# Patient Record
Sex: FEMALE | Race: WHITE | Employment: FULL TIME | URBAN - METROPOLITAN AREA
[De-identification: names, ages, dates, MRNs, and addresses within clinical notes are randomized per-mention and may not be internally consistent; named-entity substitution may affect disease eponyms.]

---

## 2019-12-11 LAB
CHLAMYDIA, EXTERNAL: NEGATIVE
HBSAG, EXTERNAL: NEGATIVE
HIV, EXTERNAL: NON REACTIVE
N. GONORRHEA, EXTERNAL: NEGATIVE
RUBELLA, EXTERNAL: NORMAL
T. PALLIDUM, EXTERNAL: NON REACTIVE
TYPE, ABO & RH, EXTERNAL: NORMAL

## 2020-05-11 LAB — ANTIBODY SCREEN, EXTERNAL: NEGATIVE

## 2020-07-08 ENCOUNTER — ANESTHESIA EVENT (OUTPATIENT)
Dept: LABOR AND DELIVERY | Age: 30
End: 2020-07-08
Payer: COMMERCIAL

## 2020-07-09 ENCOUNTER — HOSPITAL ENCOUNTER (OUTPATIENT)
Dept: PREADMISSION TESTING | Age: 30
Discharge: HOME | End: 2020-07-09
Payer: COMMERCIAL

## 2020-07-09 DIAGNOSIS — U07.1 COVID-19: ICD-10-CM

## 2020-07-09 PROCEDURE — 87635 SARS-COV-2 COVID-19 AMP PRB: CPT

## 2020-07-10 LAB — SARS-COV-2, COV2NT: NOT DETECTED

## 2020-07-13 ENCOUNTER — HOSPITAL ENCOUNTER (INPATIENT)
Age: 30
LOS: 3 days | Discharge: HOME | End: 2020-07-16
Attending: OBSTETRICS & GYNECOLOGY | Admitting: OBSTETRICS & GYNECOLOGY
Payer: COMMERCIAL

## 2020-07-13 ENCOUNTER — ANESTHESIA (OUTPATIENT)
Dept: LABOR AND DELIVERY | Age: 30
End: 2020-07-13
Payer: COMMERCIAL

## 2020-07-13 DIAGNOSIS — G89.18 POSTOPERATIVE PAIN: Primary | ICD-10-CM

## 2020-07-13 LAB
ALBUMIN SERPL-MCNC: 2.5 G/DL (ref 3.5–5)
ALBUMIN/GLOB SERPL: 0.7 {RATIO} (ref 1.1–2.2)
ALP SERPL-CCNC: 202 U/L (ref 45–117)
ALT SERPL-CCNC: 16 U/L (ref 12–78)
ANION GAP SERPL CALC-SCNC: 10 MMOL/L (ref 5–15)
AST SERPL-CCNC: 18 U/L (ref 15–37)
BILIRUB SERPL-MCNC: 0.3 MG/DL (ref 0.2–1)
BUN SERPL-MCNC: 7 MG/DL (ref 6–20)
BUN/CREAT SERPL: 11 (ref 12–20)
CALCIUM SERPL-MCNC: 8.6 MG/DL (ref 8.5–10.1)
CHLORIDE SERPL-SCNC: 111 MMOL/L (ref 97–108)
CO2 SERPL-SCNC: 20 MMOL/L (ref 21–32)
CREAT SERPL-MCNC: 0.61 MG/DL (ref 0.55–1.02)
CREAT UR-MCNC: 137 MG/DL
ERYTHROCYTE [DISTWIDTH] IN BLOOD BY AUTOMATED COUNT: 13.3 % (ref 11.5–14.5)
GLOBULIN SER CALC-MCNC: 3.4 G/DL (ref 2–4)
GLUCOSE SERPL-MCNC: 75 MG/DL (ref 65–100)
GRBS, EXTERNAL: POSITIVE
HCT VFR BLD AUTO: 33.6 % (ref 35–47)
HGB BLD-MCNC: 10.8 G/DL (ref 11.5–16)
LDH SERPL L TO P-CCNC: 205 U/L (ref 81–246)
MCH RBC QN AUTO: 28.7 PG (ref 26–34)
MCHC RBC AUTO-ENTMCNC: 32.1 G/DL (ref 30–36.5)
MCV RBC AUTO: 89.4 FL (ref 80–99)
NRBC # BLD: 0 K/UL (ref 0–0.01)
NRBC BLD-RTO: 0 PER 100 WBC
PLATELET # BLD AUTO: 212 K/UL (ref 150–400)
PMV BLD AUTO: 10.8 FL (ref 8.9–12.9)
POTASSIUM SERPL-SCNC: 4 MMOL/L (ref 3.5–5.1)
PROT SERPL-MCNC: 5.9 G/DL (ref 6.4–8.2)
PROT UR-MCNC: 41 MG/DL (ref 0–11.9)
PROT/CREAT UR-RTO: 0.3
RBC # BLD AUTO: 3.76 M/UL (ref 3.8–5.2)
SODIUM SERPL-SCNC: 141 MMOL/L (ref 136–145)
WBC # BLD AUTO: 9.5 K/UL (ref 3.6–11)

## 2020-07-13 PROCEDURE — 76060000078 HC EPIDURAL ANESTHESIA: Performed by: OBSTETRICS & GYNECOLOGY

## 2020-07-13 PROCEDURE — 36415 COLL VENOUS BLD VENIPUNCTURE: CPT

## 2020-07-13 PROCEDURE — 77030031139 HC SUT VCRL2 J&J -A

## 2020-07-13 PROCEDURE — 84156 ASSAY OF PROTEIN URINE: CPT

## 2020-07-13 PROCEDURE — 74011000250 HC RX REV CODE- 250: Performed by: ANESTHESIOLOGY

## 2020-07-13 PROCEDURE — 74011250636 HC RX REV CODE- 250/636: Performed by: OBSTETRICS & GYNECOLOGY

## 2020-07-13 PROCEDURE — 76010000397 HC C SECN MULTIPL FIRST 1 HR: Performed by: OBSTETRICS & GYNECOLOGY

## 2020-07-13 PROCEDURE — 77030002974 HC SUT PLN J&J -A

## 2020-07-13 PROCEDURE — 77030040830 HC CATH URETH FOL MDII -A

## 2020-07-13 PROCEDURE — 74011250636 HC RX REV CODE- 250/636: Performed by: NURSE ANESTHETIST, CERTIFIED REGISTERED

## 2020-07-13 PROCEDURE — 74011250636 HC RX REV CODE- 250/636

## 2020-07-13 PROCEDURE — 75410000003 HC RECOV DEL/VAG/CSECN EA 0.5 HR: Performed by: OBSTETRICS & GYNECOLOGY

## 2020-07-13 PROCEDURE — 86921 COMPATIBILITY TEST INCUBATE: CPT

## 2020-07-13 PROCEDURE — 80053 COMPREHEN METABOLIC PANEL: CPT

## 2020-07-13 PROCEDURE — 74011250636 HC RX REV CODE- 250/636: Performed by: ANESTHESIOLOGY

## 2020-07-13 PROCEDURE — 86900 BLOOD TYPING SEROLOGIC ABO: CPT

## 2020-07-13 PROCEDURE — 86920 COMPATIBILITY TEST SPIN: CPT

## 2020-07-13 PROCEDURE — 77030040361 HC SLV COMPR DVT MDII -B

## 2020-07-13 PROCEDURE — 77030018836 HC SOL IRR NACL ICUM -A

## 2020-07-13 PROCEDURE — 77030011220 HC DEV ELECSURG COVD -B

## 2020-07-13 PROCEDURE — 74011000250 HC RX REV CODE- 250: Performed by: OBSTETRICS & GYNECOLOGY

## 2020-07-13 PROCEDURE — 77030040012

## 2020-07-13 PROCEDURE — 83615 LACTATE (LD) (LDH) ENZYME: CPT

## 2020-07-13 PROCEDURE — 86870 RBC ANTIBODY IDENTIFICATION: CPT

## 2020-07-13 PROCEDURE — 86922 COMPATIBILITY TEST ANTIGLOB: CPT

## 2020-07-13 PROCEDURE — 77030007866 HC KT SPN ANES BBMI -B: Performed by: NURSE ANESTHETIST, CERTIFIED REGISTERED

## 2020-07-13 PROCEDURE — 77030002933 HC SUT MCRYL J&J -A

## 2020-07-13 PROCEDURE — 77030018846 HC SOL IRR STRL H20 ICUM -A

## 2020-07-13 PROCEDURE — 65410000002 HC RM PRIVATE OB

## 2020-07-13 PROCEDURE — 77030041076 HC DRSG AG OPTICELL MDII -A

## 2020-07-13 PROCEDURE — 76010000398 HC C SECN MULTIPL EA ADDL 0.5 HR: Performed by: OBSTETRICS & GYNECOLOGY

## 2020-07-13 PROCEDURE — 85027 COMPLETE CBC AUTOMATED: CPT

## 2020-07-13 RX ORDER — MORPHINE SULFATE 10 MG/ML
6 INJECTION, SOLUTION INTRAMUSCULAR; INTRAVENOUS
Status: ACTIVE | OUTPATIENT
Start: 2020-07-13 | End: 2020-07-14

## 2020-07-13 RX ORDER — AMMONIA 15 % (W/V)
1 AMPUL (EA) INHALATION AS NEEDED
Status: DISCONTINUED | OUTPATIENT
Start: 2020-07-13 | End: 2020-07-16 | Stop reason: HOSPADM

## 2020-07-13 RX ORDER — OXYCODONE AND ACETAMINOPHEN 5; 325 MG/1; MG/1
1 TABLET ORAL
Status: DISCONTINUED | OUTPATIENT
Start: 2020-07-13 | End: 2020-07-16 | Stop reason: HOSPADM

## 2020-07-13 RX ORDER — ONDANSETRON 2 MG/ML
4 INJECTION INTRAMUSCULAR; INTRAVENOUS
Status: DISCONTINUED | OUTPATIENT
Start: 2020-07-13 | End: 2020-07-16 | Stop reason: HOSPADM

## 2020-07-13 RX ORDER — OXYTOCIN/RINGER'S LACTATE 20/1000 ML
999 PLASTIC BAG, INJECTION (ML) INTRAVENOUS AS NEEDED
Status: DISCONTINUED | OUTPATIENT
Start: 2020-07-13 | End: 2020-07-16 | Stop reason: HOSPADM

## 2020-07-13 RX ORDER — SODIUM CHLORIDE 0.9 % (FLUSH) 0.9 %
5-40 SYRINGE (ML) INJECTION AS NEEDED
Status: DISCONTINUED | OUTPATIENT
Start: 2020-07-13 | End: 2020-07-16 | Stop reason: HOSPADM

## 2020-07-13 RX ORDER — SODIUM CHLORIDE 0.9 % (FLUSH) 0.9 %
5-40 SYRINGE (ML) INJECTION EVERY 8 HOURS
Status: DISCONTINUED | OUTPATIENT
Start: 2020-07-13 | End: 2020-07-16 | Stop reason: HOSPADM

## 2020-07-13 RX ORDER — SIMETHICONE 80 MG
80 TABLET,CHEWABLE ORAL
Status: DISCONTINUED | OUTPATIENT
Start: 2020-07-13 | End: 2020-07-16 | Stop reason: HOSPADM

## 2020-07-13 RX ORDER — FOLIC ACID 1 MG/1
TABLET ORAL DAILY
COMMUNITY
End: 2020-07-16

## 2020-07-13 RX ORDER — SODIUM CHLORIDE, SODIUM LACTATE, POTASSIUM CHLORIDE, CALCIUM CHLORIDE 600; 310; 30; 20 MG/100ML; MG/100ML; MG/100ML; MG/100ML
INJECTION, SOLUTION INTRAVENOUS
Status: DISCONTINUED | OUTPATIENT
Start: 2020-07-13 | End: 2020-07-13 | Stop reason: HOSPADM

## 2020-07-13 RX ORDER — MORPHINE SULFATE 10 MG/ML
10 INJECTION, SOLUTION INTRAMUSCULAR; INTRAVENOUS
Status: ACTIVE | OUTPATIENT
Start: 2020-07-13 | End: 2020-07-14

## 2020-07-13 RX ORDER — OXYCODONE AND ACETAMINOPHEN 5; 325 MG/1; MG/1
2 TABLET ORAL
Status: DISCONTINUED | OUTPATIENT
Start: 2020-07-13 | End: 2020-07-16 | Stop reason: HOSPADM

## 2020-07-13 RX ORDER — DOCUSATE SODIUM 100 MG/1
100 CAPSULE, LIQUID FILLED ORAL
Status: DISCONTINUED | OUTPATIENT
Start: 2020-07-13 | End: 2020-07-16 | Stop reason: HOSPADM

## 2020-07-13 RX ORDER — MORPHINE SULFATE 1 MG/ML
INJECTION, SOLUTION EPIDURAL; INTRATHECAL; INTRAVENOUS
Status: COMPLETED | OUTPATIENT
Start: 2020-07-13 | End: 2020-07-13

## 2020-07-13 RX ORDER — OXYTOCIN/RINGER'S LACTATE 20/1000 ML
125 PLASTIC BAG, INJECTION (ML) INTRAVENOUS AS NEEDED
Status: DISCONTINUED | OUTPATIENT
Start: 2020-07-13 | End: 2020-07-16 | Stop reason: HOSPADM

## 2020-07-13 RX ORDER — SODIUM CHLORIDE, SODIUM LACTATE, POTASSIUM CHLORIDE, CALCIUM CHLORIDE 600; 310; 30; 20 MG/100ML; MG/100ML; MG/100ML; MG/100ML
125 INJECTION, SOLUTION INTRAVENOUS CONTINUOUS
Status: DISCONTINUED | OUTPATIENT
Start: 2020-07-13 | End: 2020-07-16 | Stop reason: HOSPADM

## 2020-07-13 RX ORDER — ACETAMINOPHEN 325 MG/1
650 TABLET ORAL
Status: DISCONTINUED | OUTPATIENT
Start: 2020-07-13 | End: 2020-07-16 | Stop reason: HOSPADM

## 2020-07-13 RX ORDER — IBUPROFEN 400 MG/1
800 TABLET ORAL EVERY 8 HOURS
Status: DISCONTINUED | OUTPATIENT
Start: 2020-07-13 | End: 2020-07-16 | Stop reason: HOSPADM

## 2020-07-13 RX ORDER — ONDANSETRON 2 MG/ML
INJECTION INTRAMUSCULAR; INTRAVENOUS AS NEEDED
Status: DISCONTINUED | OUTPATIENT
Start: 2020-07-13 | End: 2020-07-13 | Stop reason: HOSPADM

## 2020-07-13 RX ORDER — CEFAZOLIN SODIUM/WATER 2 G/20 ML
2 SYRINGE (ML) INTRAVENOUS ONCE
Status: COMPLETED | OUTPATIENT
Start: 2020-07-13 | End: 2020-07-13

## 2020-07-13 RX ORDER — KETOROLAC TROMETHAMINE 30 MG/ML
30 INJECTION, SOLUTION INTRAMUSCULAR; INTRAVENOUS
Status: DISCONTINUED | OUTPATIENT
Start: 2020-07-13 | End: 2020-07-16 | Stop reason: HOSPADM

## 2020-07-13 RX ORDER — ONDANSETRON 4 MG/1
4 TABLET, ORALLY DISINTEGRATING ORAL
Status: DISCONTINUED | OUTPATIENT
Start: 2020-07-13 | End: 2020-07-16 | Stop reason: HOSPADM

## 2020-07-13 RX ORDER — OXYTOCIN/RINGER'S LACTATE 20/1000 ML
PLASTIC BAG, INJECTION (ML) INTRAVENOUS
Status: DISPENSED
Start: 2020-07-13 | End: 2020-07-14

## 2020-07-13 RX ORDER — DIPHENHYDRAMINE HCL 25 MG
25 CAPSULE ORAL
Status: DISCONTINUED | OUTPATIENT
Start: 2020-07-13 | End: 2020-07-16 | Stop reason: HOSPADM

## 2020-07-13 RX ORDER — GUAIFENESIN 100 MG/5ML
81 LIQUID (ML) ORAL DAILY
COMMUNITY
End: 2020-07-16

## 2020-07-13 RX ORDER — BUPIVACAINE HYDROCHLORIDE 5 MG/ML
INJECTION, SOLUTION EPIDURAL; INTRACAUDAL
Status: COMPLETED | OUTPATIENT
Start: 2020-07-13 | End: 2020-07-13

## 2020-07-13 RX ORDER — OXYTOCIN/RINGER'S LACTATE 20/1000 ML
125-1000 PLASTIC BAG, INJECTION (ML) INTRAVENOUS
Status: DISCONTINUED | OUTPATIENT
Start: 2020-07-13 | End: 2020-07-16 | Stop reason: HOSPADM

## 2020-07-13 RX ORDER — HYDROCORTISONE 1 %
CREAM (GRAM) TOPICAL AS NEEDED
Status: DISCONTINUED | OUTPATIENT
Start: 2020-07-13 | End: 2020-07-16 | Stop reason: HOSPADM

## 2020-07-13 RX ADMIN — SODIUM CHLORIDE, POTASSIUM CHLORIDE, SODIUM LACTATE AND CALCIUM CHLORIDE: 600; 310; 30; 20 INJECTION, SOLUTION INTRAVENOUS at 13:47

## 2020-07-13 RX ADMIN — BUPIVACAINE HYDROCHLORIDE 10 MG: 5 INJECTION, SOLUTION EPIDURAL; INTRACAUDAL; PERINEURAL at 14:38

## 2020-07-13 RX ADMIN — SODIUM CHLORIDE, SODIUM LACTATE, POTASSIUM CHLORIDE, AND CALCIUM CHLORIDE 1000 ML: 600; 310; 30; 20 INJECTION, SOLUTION INTRAVENOUS at 12:20

## 2020-07-13 RX ADMIN — MORPHINE SULFATE 0.2 MG: 1 INJECTION, SOLUTION EPIDURAL; INTRATHECAL; INTRAVENOUS at 14:38

## 2020-07-13 RX ADMIN — ONDANSETRON 4 MG: 2 INJECTION INTRAMUSCULAR; INTRAVENOUS at 18:26

## 2020-07-13 RX ADMIN — SODIUM CHLORIDE, SODIUM LACTATE, POTASSIUM CHLORIDE, AND CALCIUM CHLORIDE 125 ML/HR: 600; 310; 30; 20 INJECTION, SOLUTION INTRAVENOUS at 16:41

## 2020-07-13 RX ADMIN — KETOROLAC TROMETHAMINE 30 MG: 30 INJECTION, SOLUTION INTRAMUSCULAR at 23:50

## 2020-07-13 RX ADMIN — CEFAZOLIN SODIUM 2 G: 100 INJECTION, POWDER, LYOPHILIZED, FOR SOLUTION INTRAVENOUS at 14:19

## 2020-07-13 RX ADMIN — SODIUM CHLORIDE, SODIUM LACTATE, POTASSIUM CHLORIDE, AND CALCIUM CHLORIDE 1000 ML: 600; 310; 30; 20 INJECTION, SOLUTION INTRAVENOUS at 14:10

## 2020-07-13 RX ADMIN — ONDANSETRON HYDROCHLORIDE 4 MG: 2 INJECTION, SOLUTION INTRAMUSCULAR; INTRAVENOUS at 14:43

## 2020-07-13 RX ADMIN — PHENYLEPHRINE HYDROCHLORIDE 25 MCG/MIN: 10 INJECTION INTRAVENOUS at 14:29

## 2020-07-13 NOTE — L&D DELIVERY NOTE
Delivery Summary    Patient: Nupur You MRN: [id number]  SSN: [id number]    YOB: 1990  Age: 34 y.o. Sex: female        Information for the patient's :  James Garcia [[id number]]       Labor Events:    Labor: No    Steroids: None   Cervical Ripening Date/Time:       Cervical Ripening Type: None   Antibiotics During Labor: No   Rupture Identifier:      Rupture Date/Time: 2020 3:01 PM   Rupture Type: AROM   Amniotic Fluid Volume: Large    Amniotic Fluid Description: Clear    Amniotic Fluid Odor: None    Induction: None       Induction Date/Time:        Indications for Induction:      Augmentation: None   Augmentation Date/Time:      Indications for Augmentation:     Labor complications: None       Additional complications:        Delivery Events:  Indications For Episiotomy:     Episiotomy: None   Perineal Laceration(s): None   Repaired:     Periurethral Laceration Location:      Repaired:     Labial Laceration Location:     Repaired:     Sulcal Laceration Location:     Repaired:     Vaginal Laceration Location:     Repaired:     Cervical Laceration Location:     Repaired:     Repair Suture: None   Number of Repair Packets:     Estimated Blood Loss (ml):  ml   Quantitaive Blood Loss (ml):             Delivery Date: 2020    Delivery Time: 3:02 PM   Delivery Type: , Low Transverse     Details    Trial of Labor: No   Primary/Repeat: Primary   Priority: Routine   Indications:  Multiple Gestation       Sex:  Male     Gestational Age: 42w4d  Delivery Clinician:  Nataliia Stewart  Living Status: Living   Delivery Location: L&D  OR OR 2          APGARS  One minute Five minutes Ten minutes   Skin color: 1   1        Heart rate: 2   2        Grimace: 2   2        Muscle tone: 2   2        Breathin   2        Totals: 8   9          Presentation: Vertex    Position:        Resuscitation Method:  Suctioning-bulb; Tactile Stimulation     Meconium Stained: None      Cord Information: 3 Vessels  Complications: None  Cord around:    Delayed cord clamping? Yes  Cord clamped date/time:2020  3:03 PM  Disposition of Cord Blood: Lab    Blood Gases Sent?: No    Placenta:  Date/Time: 2020  3:04 PM  Removal: Expressed      Appearance: Normal     [place] Measurements:  Birth Weight:        Birth Length:        Head Circumference:        Chest Circumference:       Abdominal Girth: Other Providers:   LUPE OLMOS;RUDY HUBER;DIEGO CAGLE;AGUSTIN ZENDEJAS;HAYDEN HESTER, Obstetrician;Primary Nurse;Primary [place] Nurse; Anesthesiologist;Crna         Information for the patient's :  Alvarez Liang [[id number]]       Labor Events:    Labor: No    Steroids: None   Cervical Ripening Date/Time:       Cervical Ripening Type: None   Antibiotics During Labor: No   Rupture Identifier:      Rupture Date/Time: 2020 3:01 PM   Rupture Type:     Amniotic Fluid Volume: Large    Amniotic Fluid Description: Clear    Amniotic Fluid Odor: None    Induction: None       Induction Date/Time:        Indications for Induction:      Augmentation: None   Augmentation Date/Time:      Indications for Augmentation:     Labor complications: None       Additional complications:        Delivery Events:  Indications For Episiotomy:     Episiotomy: None   Perineal Laceration(s): None   Repaired:     Periurethral Laceration Location:      Repaired:     Labial Laceration Location:     Repaired:     Sulcal Laceration Location:     Repaired:     Vaginal Laceration Location:     Repaired:     Cervical Laceration Location:     Repaired:     Repair Suture: None   Number of Repair Packets:     Estimated Blood Loss (ml):  ml   Quantitaive Blood Loss (ml):             Delivery Date: 2020    Delivery Time: 3:03 PM   Delivery Type:       Details    Trial of Labor:     Primary/Repeat:     Priority:     Indications:          Sex:  Male     Gestational Age: 42w4d  Delivery Clinician:  Randal Davenport  Living Status: Living   Delivery Location: L&D  OR OR 2          APGARS  One minute Five minutes Ten minutes   Skin color: 1   1        Heart rate: 2   2        Grimace: 2   2        Muscle tone: 2   2        Breathin   2        Totals: 9   9          Presentation:      Position:        Resuscitation Method:  Tactile Stimulation     Meconium Stained: None      Cord Information: 3 Vessels  Complications: None  Cord around:    Delayed cord clamping? Yes  Cord clamped date/time:   Disposition of Cord Blood: Lab    Blood Gases Sent?:      Placenta:  Date/Time:    Removal:        Appearance:        Measurements:  Birth Weight:        Birth Length:        Head Circumference:        Chest Circumference:       Abdominal Girth: Other Providers:   LUPE OLMOS;RUDY HUBER;GEOVANY VELÁSQUEZ;AGUSTIN ZENDEJAS;HAYDEN HESTER, Obstetrician;Primary Nurse;Primary  Nurse; Anesthesiologist;Crna             Group B Strep:   Lab Results   Component Value Date/Time    GrBStrep, External Positive 2020     Information for the patient's :  Madeline Centeno [[id number]]   No results found for: ABORH, PCTABR, PCTDIG, BILI, ABORHEXT, [address]   Information for the patient's :  Madeline Centeno [[id number]]   No results found for: ABORH, PCTABR, PCTDIG, BILI, ABORHEXT, ABORH     No results for input(s): PCO2CB, PO2CB, HCO3I, SO2I, IBD, PTEMPI, SPECTI, PHICB, ISITE, IDEV, IALLEN in the last 72 hours.

## 2020-07-13 NOTE — ROUTINE PROCESS
1758: TRANSFER - IN REPORT:    Verbal report received from KEVIN Tomlinson RN (name) on Melburn Melva  being received from L&D (unit) for routine progression of care      Report consisted of patients Situation, Background, Assessment and   Recommendations(SBAR). Information from the following report(s) SBAR, Intake/Output, MAR and Recent Results was reviewed with the receiving nurse. Opportunity for questions and clarification was provided. Assessment completed upon patients arrival to unit and care assumed.

## 2020-07-13 NOTE — OP NOTES
Operative Note    Name: Aditya Robert   Medical Record Number: [id number]   YOB: 1990  Today's Date: 2020      Pre-operative Diagnosis: , TWINS, 1 BREECH    Post-operative Diagnosis: same, delivered    Operation: low transverse  section Procedure(s):   SECTION MULTIPLE    Surgeon(s):  Bhavin Martinez MD    Anesthesia: Spinal    Prophylactic Antibiotics: Ancef  DVT Prophylaxis: Sequential Compression Devices         Fetal Description: multiple gestation 2     Birth Information:   Information for the patient's :  Mariela Centeno [[id number]]   Delivery of a   male infant on 2020 at 3:02 PM. Apgars were 8  and 9 . Umbilical Cord: 3 Vessels     Umbilical Cord Events: None     Placenta: Expressed removal with Normal appearance. Amniotic Fluid Volume:  Large     Amniotic Fluid Description:  Clear    Information for the patient's :  Mariela Centeno [[id number]]   Delivery of a   male infant on 2020 at 3:03 PM. Apgars were 9  and 9 . Umbilical Cord: 3 Vessels     Umbilical Cord Events: None     Placenta:   removal with   appearance. Amniotic Fluid Volume:  Large     Amniotic Fluid Description:  Clear        Umbilical Cord: Cord blood sent to lab for type, Rh, and Sami' test    Placenta:  expressed    Estimated Blood Loss (ml):  800 cc    Specimens: None           Complications:  none    Procedure Detail:      After proper patient identification and consent, the patient was taken to the operating room, where spinal anesthesia was administered and found to be adequate. Smith catheter had been placed using sterile technique. The patient was prepped and draped in the normal sterile fashion. The abdomen was entered using the Pfannenstiel technique. The peritoneum was entered sharply well superior to the bladder without any apparent injury. The bladder flap was created without difficulty.  A low transverse uterine incision was made with the scalpel and extended with blunt finger dissection. Amniotomy was performed and the fluid was copius amount clear. The babys head was then delivered atraumatically. The nose and mouth were suctioned. The cord was clamped and cut and the baby was handed off to Nursing staff in attendance. Second amniotomy was performed and second twin was delivered atraumatically. The nose and mouth were suctioned. The cord was clamped and cut and the baby was also handed off to nursing staff in attendance. Placenta was then removed from the uterus. The uterus was curettaged with a moist lap pad and cleared of all clots and debris. The uterine incision was closed with 0 vicryl, double layer  in running locking fashion with good hemostasis assured. The posterior cul-de-sac was irrigated with warm normal saline. Figure of eight stitches were used for nonhemostatic areas. Good hemostasis was again reassured and the uterus was returned to the abdomen. The anterior pelvis was irrigated with warm normal saline and good hemostasis was again reassured throughout. Chuck was applied to the hysterotomy for added hemostasis. The fascia was closed with 0 Vicryl in a running fashion. Good hemostasis was assured. The subcutaneous layer was closed with 2-0 plain in an interrupted fashion. The skin was closed with a 4-0 vicryl subcuticular closure. Aquacel dressing was applied to the incision. The patient tolerated the procedure well. Sponge, lap, and needle counts were correct times three and the patient and baby were taken to recovery/postpartum room in stable condition.     Ximena Delarosa MD  July 13, 2020  3:43 PM

## 2020-07-13 NOTE — H&P
History & Physical    Name: Anselmo Hood MRN: [id number]  SSN: [id number]    YOB: 1990  Age: 34 y.o. Sex: female      Subjective:     Estimated Date of Delivery: 20  OB History    Para Term  AB Living   1             SAB TAB Ectopic Molar Multiple Live Births                    # Outcome Date GA Lbr Maury/2nd Weight Sex Delivery Anes PTL Lv   1 Current                Ms. Lizett Delgado admitted with pregnancy at 37w1d for  section due to [address] with vertex/breech presentation. Please see prenatal records for details. She denies HA, visual changes, RUQ Pain, ctx, vb, lof. Anxious about csection. Past Medical History:   Diagnosis Date    Anemia     Asthma     Gestational hypertension     Other ill-defined conditions(519.29)     ALLERGIES    Rhesus isoimmunization affecting pregnancy      Past Surgical History:   Procedure Laterality Date    HX OTHER SURGICAL  2012    wisdom teeth     Social History     Occupational History    Not on file   Tobacco Use    Smoking status: Never Smoker    Smokeless tobacco: Never Used   Substance and Sexual Activity    Alcohol use: No    Drug use: No    Sexual activity: Yes     History reviewed. No pertinent family history. Allergies   Allergen Reactions    Tetracyclines Anaphylaxis    Amoxicillin Rash     Prior to Admission medications    Medication Sig Start Date End Date Taking? Authorizing Provider   prenatal 123/iron/folic/omeg3s (ONE-A-DAY WOMEN'S PRENATAL 1 PO) Take  by mouth. Yes Provider, Historical   folic acid (FOLVITE) 1 mg tablet Take  by mouth daily. Yes Provider, Historical   aspirin 81 mg chewable tablet Take 81 mg by mouth daily. Yes Provider, Historical   methylPREDNIsolone (MEDROL, TONY,) 4 mg tablet Take  by mouth.  Per dose pack instructions 7/22/10   JOHN Mar        Review of Systems: A comprehensive review of systems was negative except for that written in the History of Present Illness. Objective:     Vitals:  Vitals:    20 1210 20 1224 20 1303   BP: 144/88  150/86   Pulse: (!) 109  94   Resp: 16     Temp: 98.4 °F (36.9 °C)     Weight:  100.2 kg (221 lb)    Height:  5' 2\" (1.575 m)         Physical Exam:  Patient without distress. Heart: Regular rate and rhythm  Lung: clear to auscultation throughout lung fields, no wheezes, no rales, no rhonchi and normal respiratory effort  Abdomen: soft, nontender  Membranes:  Intact  Fetal Heart Rate: Reactive x2    Prenatal Labs:   No results found for: ABORH, RUBELLAEXT, GRBSEXT, HBSAGEXT, HIVEXT, RPREXT, GONNOEXT, CHLAMEXT, ABORHEXT, RUBELLAEXT, GRBSEXT, HBSAGEXT, HIVEXT, RPREXT, GONNOEXT, CHLAMEXT, ABORHEXT, RUBELLAEXT, GRBSEXT, HBSAGEXT, HIVEXT, RPREXT, GONNOEXT, CHLAMEXT      Impression/Plan:     Plan:  Admit for  section. Discussed the risks of surgery including the risks of bleeding, infection, deep vein thrombosis, and surgical injuries to internal organs including but not limited to the bowels, bladder, rectum, and female reproductive organs. The patient understands the risks; any and all questions were answered to the patient's satisfaction.

## 2020-07-13 NOTE — PROGRESS NOTES
1202 Patient arrived to LD 6 for scheduled primary c/section for twins - vertex and breech. Assessment started. 1425 Patient transferred ambulatory for LD 6 to OR 2.    1502 Delivery of live born male  [address] by Dr. Sudhir Gardner via primary low transverse c/section    1503 Delivery of second live born male Ernesto Capellan 44 B by Dr. Sudhir Gardner via primary  Low transverse c/section    [id number] Patient transferred from [address] [place] [address] to LD 6 for recovery. 1758 TRANSFER - OUT REPORT:    Verbal report given to Kana RN(name) on [facility]  being transferred to MIU(unit) for routine post - op       Report consisted of patients Situation, Background, Assessment and   Recommendations(SBAR). Information from the following report(s) SBAR, Kardex, OR Summary, Procedure Summary, Intake/Output, MAR and Recent Results was reviewed with the receiving nurse. Lines:   Peripheral IV 07/13/20 Left Arm (Active)   Site Assessment Clean, dry, & intact 07/13/20 1215   Phlebitis Assessment 0 07/13/20 1215   Infiltration Assessment 0 07/13/20 1215   Dressing Status Clean, dry, & intact 07/13/20 1215   Dressing Type Tape;Transparent 07/13/20 1215   Hub Color/Line Status Pink; Infusing 07/13/20 1215   Action Taken Blood drawn 07/13/20 1215        Opportunity for questions and clarification was provided.       Patient transported with:   Registered Nurse

## 2020-07-13 NOTE — ANESTHESIA POSTPROCEDURE EVALUATION
Post-Anesthesia Evaluation and Assessment    Patient: Delfina Frye MRN: [id number]  SSN: [id number]    YOB: 1990  Age: 34 y.o. Sex: female      I have evaluated the patient and they are stable and ready for discharge from the PACU. Cardiovascular Function/Vital Signs  Visit Vitals  /74 (BP 1 Location: Right arm, BP Patient Position: At rest)   Pulse 73   Temp 36.9 °C (98.5 °F)   Resp 18   Ht 5' 2\" (1.575 m)   Wt 100.2 kg (221 lb)   SpO2 98%   Breastfeeding Unknown   BMI 40.42 kg/m²       Patient is status post Spinal anesthesia for Procedure(s) with comments:   SECTION MULTIPLE - EDC 20. Nausea/Vomiting: None    Postoperative hydration reviewed and adequate. Pain:  Pain Scale 1: Numeric (0 - 10) (20 155)  Pain Intensity 1: 0 (20 155)   Managed    Neurological Status:   Neuro (WDL): Within Defined Limits (20 155)   At baseline    Mental Status, Level of Consciousness: Alert and  oriented to person, place, and time    Pulmonary Status:   O2 Device: Room air (20 155)   Adequate oxygenation and airway patent    Complications related to anesthesia: None    Post-anesthesia assessment completed. No concerns    Signed By: Alonso Ricketts MD     2020              Procedure(s):   SECTION MULTIPLE. spinal    <BSHSIANPOST>    INITIAL Post-op Vital signs:   Vitals Value Taken Time   /61 2020  4:53 PM   Temp 36.9 °C (98.5 °F) 2020  3:52 PM   Pulse 74 2020  4:53 PM   Resp 18 2020  4:38 PM   SpO2 97 % 2020  4:53 PM   Vitals shown include unvalidated device data.

## 2020-07-13 NOTE — ANESTHESIA PROCEDURE NOTES
Spinal Block    Start time: 7/13/2020 2:30 PM  End time: 7/13/2020 2:38 PM  Performed by: Tyree Matamoros CRNA  Authorized by: Murtaza Dozier MD     Pre-procedure:   Indications: primary anesthetic  Preanesthetic Checklist: risks and benefits discussed and timeout performed    Timeout Time: 14:30          Spinal Block:   Patient Position:  Seated    Prep: Betadine      Location:  L3-4  Technique:  Single shot        Needle:   Needle Type:  Pencil-tip  Needle Gauge:  25 G  Attempts:  1      Events: CSF confirmed, no blood with aspiration and no paresthesia        Assessment:  Insertion:  Uncomplicated  Patient tolerance:  Patient tolerated the procedure well with no immediate complications

## 2020-07-14 LAB
BASOPHILS # BLD: 0 K/UL (ref 0–0.1)
BASOPHILS NFR BLD: 0 % (ref 0–1)
DIFFERENTIAL METHOD BLD: ABNORMAL
EOSINOPHIL # BLD: 0.1 K/UL (ref 0–0.4)
EOSINOPHIL NFR BLD: 1 % (ref 0–7)
ERYTHROCYTE [DISTWIDTH] IN BLOOD BY AUTOMATED COUNT: 13.5 % (ref 11.5–14.5)
HCT VFR BLD AUTO: 28.1 % (ref 35–47)
HGB BLD-MCNC: 9 G/DL (ref 11.5–16)
IMM GRANULOCYTES # BLD AUTO: 0.1 K/UL (ref 0–0.04)
IMM GRANULOCYTES NFR BLD AUTO: 1 % (ref 0–0.5)
LYMPHOCYTES # BLD: 2.1 K/UL (ref 0.8–3.5)
LYMPHOCYTES NFR BLD: 21 % (ref 12–49)
MCH RBC QN AUTO: 29 PG (ref 26–34)
MCHC RBC AUTO-ENTMCNC: 32 G/DL (ref 30–36.5)
MCV RBC AUTO: 90.6 FL (ref 80–99)
MONOCYTES # BLD: 0.9 K/UL (ref 0–1)
MONOCYTES NFR BLD: 9 % (ref 5–13)
NEUTS SEG # BLD: 6.8 K/UL (ref 1.8–8)
NEUTS SEG NFR BLD: 68 % (ref 32–75)
NRBC # BLD: 0 K/UL (ref 0–0.01)
NRBC BLD-RTO: 0 PER 100 WBC
PLATELET # BLD AUTO: 163 K/UL (ref 150–400)
PMV BLD AUTO: 10 FL (ref 8.9–12.9)
RBC # BLD AUTO: 3.1 M/UL (ref 3.8–5.2)
WBC # BLD AUTO: 9.9 K/UL (ref 3.6–11)

## 2020-07-14 PROCEDURE — 85461 HEMOGLOBIN FETAL: CPT

## 2020-07-14 PROCEDURE — 65410000002 HC RM PRIVATE OB

## 2020-07-14 PROCEDURE — 85025 COMPLETE CBC W/AUTO DIFF WBC: CPT

## 2020-07-14 PROCEDURE — 36415 COLL VENOUS BLD VENIPUNCTURE: CPT

## 2020-07-14 PROCEDURE — 86900 BLOOD TYPING SEROLOGIC ABO: CPT

## 2020-07-14 PROCEDURE — 74011250636 HC RX REV CODE- 250/636: Performed by: OBSTETRICS & GYNECOLOGY

## 2020-07-14 PROCEDURE — 74011250637 HC RX REV CODE- 250/637: Performed by: OBSTETRICS & GYNECOLOGY

## 2020-07-14 RX ADMIN — IBUPROFEN 800 MG: 400 TABLET, FILM COATED ORAL at 15:26

## 2020-07-14 RX ADMIN — HUMAN RHO(D) IMMUNE GLOBULIN 0.3 MG: 300 INJECTION, SOLUTION INTRAMUSCULAR at 15:28

## 2020-07-14 RX ADMIN — DOCUSATE SODIUM 100 MG: 100 CAPSULE, LIQUID FILLED ORAL at 15:26

## 2020-07-14 RX ADMIN — ACETAMINOPHEN 650 MG: 325 TABLET ORAL at 19:26

## 2020-07-14 NOTE — LACTATION NOTE
This note was copied from a baby's chart. Initial Lactation Consultation - Twin boys born by  yesterday to a  mom at 40 1/7 weeks gestation. Mom noticed breast changes during her pregnancy and we were able to express drops of colostrum from each breast.     Both baby latched and nursed yesterday but have been very sleepy today. Mom is having difficulty getting them to wake to latch and feed. I worked with mom this afternoon. We were able to get each baby latched to the breast. They both were sucking rhythmically with audible swallows. We placed them next to mom in the prone position. Feeding Plan: Mother will keep baby skin to skin as often as possible, feed on demand, respond to feeding cues, obtain latch, listen for audible swallowing, be aware of signs of oxytocin release/ cramping, thrist and sleepiness while breastfeeding. Mom will not limit the time the babies are at the breast. She will allow them to completely finish the breast. She will alternate which breast each baby starts on at each feeding.

## 2020-07-14 NOTE — PROGRESS NOTES
Post-Operative  Day 1    Fay Dials     Assessment: Post-Op day 1, stable    Plan:   1. Routine post-operative care   2. The risks and benefits of the circumcision  procedure and anesthesia including: bleeding, infection, variability of cosmetic results were discussed at length with the mother. She is aware that future repeat procedures may be necessary. She gives informed consent to proceed as noted and her questions are answered. 3. Postpartum anemia -- plan for Fe on DC home. 4. GHTN -- BPs WNL PP, no meds. Information for the patient's :  Dorenda Knife [[id number]]   , Low Transverse   Information for the patient's :  Dorenda Knife [[id number]]   , Low Transverse    Patient doing well without significant complaint. Nausea and vomiting resolved, tolerating liquids, no flatus, valente in place. Vitals:  Visit Vitals  /71 (BP 1 Location: Left arm, BP Patient Position: At rest)   Pulse 89   Temp 98.5 °F (36.9 °C) Comment: delayed due to breastfeeding   Resp 14   Ht 5' 2\" (1.575 m)   Wt 100.2 kg (221 lb)   SpO2 98%   Breastfeeding Unknown   BMI 40.42 kg/m²     Temp (24hrs), Av.3 °F (36.8 °C), Min:97.8 °F (36.6 °C), Max:98.7 °F (37.1 °C)      Last 24hr Input/Output:    Intake/Output Summary (Last 24 hours) at 2020 1335  Last data filed at 2020 0105  Gross per 24 hour   Intake --   Output 2535 ml   Net -2535 ml          Exam:        Patient without distress. Lungs clear. Abdomen, bowel sounds present, soft, expected tenderness, fundus firm Wound dressing intact     Perineum normal lochia noted               Lower extremities are negative for swelling, cords or tenderness.     Labs:   Lab Results   Component Value Date/Time    WBC 9.9 2020 05:13 AM    WBC 9.5 2020 12:34 PM    HGB 9.0 (L) 2020 05:13 AM    HGB 10.8 (L) 2020 12:34 PM    HCT 28.1 (L) 2020 05:13 AM    HCT 33.6 (L) 2020 12:34 PM PLATELET 974 17/90/5117 05:13 AM    PLATELET 711 66/21/4498 12:34 PM       Recent Results (from the past 24 hour(s))   RH IMMUNE GLOBULIN EVAL-LAB ORDER    Collection Time: 07/14/20  5:12 AM   Result Value Ref Range    ABO/Rh(D) O NEGATIVE     Fetal screen NEG     WEAK D NEG     Unit number [id number]     Blood component type RH IMMUNE GLOBULIN     Unit division 00     Status of unit ALLOCATED    CBC WITH AUTOMATED DIFF    Collection Time: 07/14/20  5:13 AM   Result Value Ref Range    WBC 9.9 3.6 - 11.0 K/uL    RBC 3.10 (L) 3.80 - 5.20 M/uL    HGB 9.0 (L) 11.5 - 16.0 g/dL    HCT 28.1 (L) 35.0 - 47.0 %    MCV 90.6 80.0 - 99.0 FL    MCH 29.0 26.0 - 34.0 PG    MCHC 32.0 30.0 - 36.5 g/dL    RDW 13.5 11.5 - 14.5 %    PLATELET 102 917 - 721 K/uL    MPV 10.0 8.9 - 12.9 FL    NRBC 0.0 0  WBC    ABSOLUTE NRBC 0.00 0.00 - 0.01 K/uL    NEUTROPHILS 68 32 - 75 %    LYMPHOCYTES 21 12 - 49 %    MONOCYTES 9 5 - 13 %    EOSINOPHILS 1 0 - 7 %    BASOPHILS 0 0 - 1 %    IMMATURE GRANULOCYTES 1 (H) 0.0 - 0.5 %    ABS. NEUTROPHILS 6.8 1.8 - 8.0 K/UL    ABS. LYMPHOCYTES 2.1 0.8 - 3.5 K/UL    ABS. MONOCYTES 0.9 0.0 - 1.0 K/UL    ABS. EOSINOPHILS 0.1 0.0 - 0.4 K/UL    ABS. BASOPHILS 0.0 0.0 - 0.1 K/UL    ABS. IMM.  GRANS. 0.1 (H) 0.00 - 0.04 K/UL    DF AUTOMATED

## 2020-07-14 NOTE — PROGRESS NOTES
Pain  Service, Anesthesia Post LSCS  Note:    Patient POD-  1, S/P LSCS under spinal with duramorph     Cardiovascular Function/Vital Signs  Visit Vitals  /69 (BP 1 Location: Left arm, BP Patient Position: At rest)   Pulse 82   Temp 36.7 °C (98 °F)   Resp 14   Ht 5' 2\" (1.575 m)   Wt 100.2 kg (221 lb)   SpO2 98%   Breastfeeding Unknown   BMI 40.42 kg/m²       Nausea/Vomiting: none    Postoperative hydration adequate    Pain:  Pain Scale 1: Numeric (0 - 10) (07/14/20 0857)  Pain Intensity 1: 0 (07/14/20 0857)   Pain managed     No numbness, weakness, headache or fever .   Spinal/epidural site clean    Plan  Continue current Pain  Regime   No anesthesia complication

## 2020-07-14 NOTE — ROUTINE PROCESS
3140: Bedside shift change report given to DILLON Schreiber RN (oncoming nurse) by BRINDA Hwang RN (offgoing nurse). Report included the following information SBAR.

## 2020-07-15 LAB
ABO + RH BLD: NORMAL
BLD PROD TYP BPU: NORMAL
BLOOD GROUP ANTIBODIES SERPL: NORMAL
BLOOD GROUP ANTIBODIES SERPL: NORMAL
BPU ID: NORMAL
CROSSMATCH RESULT,%XM: NORMAL
SPECIMEN EXP DATE BLD: NORMAL
STATUS OF UNIT,%ST: NORMAL
UNIT DIVISION, %UDIV: 0

## 2020-07-15 PROCEDURE — 74011250637 HC RX REV CODE- 250/637: Performed by: OBSTETRICS & GYNECOLOGY

## 2020-07-15 PROCEDURE — 65410000002 HC RM PRIVATE OB

## 2020-07-15 RX ORDER — FERROUS SULFATE 137(45) MG
142 TABLET, EXTENDED RELEASE ORAL
Qty: 30 TAB | Refills: 0 | Status: SHIPPED | OUTPATIENT
Start: 2020-07-15 | End: 2020-08-14

## 2020-07-15 RX ORDER — AMOXICILLIN 250 MG
2 CAPSULE ORAL
Qty: 60 TAB | Refills: 0 | Status: ON HOLD | OUTPATIENT
Start: 2020-07-15

## 2020-07-15 RX ORDER — IBUPROFEN 600 MG/1
600 TABLET ORAL
Qty: 40 TAB | Refills: 0 | Status: ON HOLD | OUTPATIENT
Start: 2020-07-15

## 2020-07-15 RX ORDER — OXYCODONE AND ACETAMINOPHEN 5; 325 MG/1; MG/1
1 TABLET ORAL
Qty: 28 TAB | Refills: 0 | Status: SHIPPED | OUTPATIENT
Start: 2020-07-15 | End: 2020-07-22

## 2020-07-15 RX ADMIN — IBUPROFEN 800 MG: 400 TABLET, FILM COATED ORAL at 08:41

## 2020-07-15 RX ADMIN — OXYCODONE HYDROCHLORIDE AND ACETAMINOPHEN 1 TABLET: 5; 325 TABLET ORAL at 21:44

## 2020-07-15 RX ADMIN — DOCUSATE SODIUM 100 MG: 100 CAPSULE, LIQUID FILLED ORAL at 17:29

## 2020-07-15 RX ADMIN — IBUPROFEN 800 MG: 400 TABLET, FILM COATED ORAL at 00:20

## 2020-07-15 RX ADMIN — IBUPROFEN 800 MG: 400 TABLET, FILM COATED ORAL at 17:29

## 2020-07-15 RX ADMIN — OXYCODONE HYDROCHLORIDE AND ACETAMINOPHEN 1 TABLET: 5; 325 TABLET ORAL at 12:37

## 2020-07-15 RX ADMIN — ACETAMINOPHEN 650 MG: 325 TABLET ORAL at 05:41

## 2020-07-15 NOTE — ROUTINE PROCESS
2646: Bedside shift change report given to S. Shannon Hatchet (oncoming nurse) by Jerald Gillette RN (offgoing nurse). Report included the following information SBAR.

## 2020-07-15 NOTE — LACTATION NOTE
This note was copied from a baby's chart. Mom states both babies have been latching and nursing. Twin A has been nursing for about 10 minutes at a feeding and twin B has been nursing for about 15 minutes at a feeding. Mom is becoming more independent getting babies latched on her own. We reviewed wake up techniques and the importance of offering the breast whenever babies are showing feeding cues. Mom will call out as needed for assistance with feedings. 65 - Mom called out for assistance with tandem feeding. We were able to get both babies latched in the football hold. They latched quickly and began sucking rhythmically with frequent audible swallows. Mom said the breast feeding is becoming more painful. Her nipples are slightly compressed when babies come off the breast. She has a small blister on the end of her nipple after nursing. Twin A has visible frenulum under tongue almost to the end of his tongue. He is able to extend his tongue to his lips. Twin B has visible frenulum under tongue to the middle of his tongue. He is able to extend his tongue to his lips. Mom is concerned that her nipples will continue to get worse.

## 2020-07-15 NOTE — DISCHARGE SUMMARY
Obstetrical Discharge Summary     Name: Evgeny Johnson MRN: [id number]  SSN: [id number]    YOB: 1990  Age: 34 y.o. Sex: female      Admit Date: 2020    Discharge Date: 2020     Admitting Physician: Holley Lunsford MD     Attending Physician:  Peggy York MD     Admission Diagnoses: 37 weeks gestation of pregnancy [Z3A.37]    Discharge Diagnoses:   Information for the patient's :  Anselmo Titus [[id number]]   Delivery of a 3.215 kg male infant via , Low Transverse on 2020 at 3:02 PM  by Holley Lunsford. Apgars were 8  and 9 . Information for the patient's :  Anselmo Titus [[id number]]   Delivery of a 3.05 kg male infant via , Low Transverse on 2020 at 3:03 PM  by Holley Lunsford. Apgars were 9  and 9 . Gestational hypertension  Acute on chronic blood loss anemia    Additional Diagnoses:   Hospital Problems  Date Reviewed: 2020    None         Lab Results   Component Value Date/Time    Rubella, External Immune 2019    GrBStrep, External Positive 2020       Hospital Course: Normal hospital course following the delivery. Her blood pressures were normotensive after delivery without any medications. Disposition at Discharge: Home or self care    Discharged Condition: Stable    Patient Instructions:   Current Discharge Medication List      START taking these medications    Details   ibuprofen (MOTRIN) 600 mg tablet Take 1 Tab by mouth every six (6) hours as needed for Pain. Qty: 40 Tab, Refills: 0      oxyCODONE-acetaminophen (PERCOCET) 5-325 mg per tablet Take 1 Tab by mouth every four (4) hours as needed for Pain for up to 7 days. Max Daily Amount: 6 Tabs. Indications: pain  Qty: 28 Tab, Refills: 0    Associated Diagnoses: Postoperative pain      ferrous sulfate (Slow Fe) 142 mg (45 mg iron) ER tablet Take 1 Tab by mouth Daily (before breakfast) for 30 days.   Qty: 30 Tab, Refills: 0      senna-docusate (PERICOLACE) 8.6-50 mg per tablet Take 2 Tabs by mouth daily as needed for Constipation. Qty: 60 Tab, Refills: 0         CONTINUE these medications which have NOT CHANGED    Details   prenatal 123/iron/folic/omeg3s (ONE-A-DAY WOMEN'S PRENATAL 1 PO) Take  by mouth. STOP taking these medications       folic acid (FOLVITE) 1 mg tablet Comments:   Reason for Stopping:         aspirin 81 mg chewable tablet Comments:   Reason for Stopping:         methylPREDNIsolone (MEDROL, TONY,) 4 mg tablet Comments:   Reason for Stopping:               Reference my discharge instructions. Follow-up Appointments   Procedures    FOLLOW UP VISIT Appointment in: One Week BP check     BP check     Standing Status:   Standing     Number of Occurrences:   1     Order Specific Question:   Appointment in     Answer:    One Week        Signed By:  David Gilliam MD     July 15, 2020

## 2020-07-15 NOTE — ROUTINE PROCESS
1851: I have read and agree with [facility] RNs charting. All medications administered under preceptors supervision.

## 2020-07-15 NOTE — PROGRESS NOTES
Post-Operative  Day 2    Carrie Montana     Assessment: Post-Op day 2, doing well    Plan:   1. Routine post-operative care  2. Male neonates, doing well  3. GHTN: normotensive, no meds  4. Acute blood loss on chronic anemia: Hgb 10.8->9.0, patient asymptomatic. Plan supplemental iron at discharge  5. Dispo: discharge postpartum day 3    Information for the patient's :  Alyssa Co [[id number]]   , Low Transverse   Information for the patient's :  Alyssa Co [[id number]]   , Low Transverse      Subjective:  Patient doing well without significant complaint. Nausea and vomiting resolved, tolerating liquids, passing flatus, voiding and ambulating without difficulty. Vitals:  Visit Vitals  /82 (BP 1 Location: Left arm, BP Patient Position: At rest)   Pulse 70   Temp 98.5 °F (36.9 °C)   Resp 16   Ht 5' 2\" (1.575 m)   Wt 100.2 kg (221 lb)   SpO2 98%   Breastfeeding Unknown   BMI 40.42 kg/m²     Temp (24hrs), Av.2 °F (36.8 °C), Min:97.8 °F (36.6 °C), Max:98.5 °F (36.9 °C)        Exam:        Patient without distress. Abdomen, bowel sounds present, soft, expected tenderness, fundus firm                Wound incision clean, dry and intact               Lower extremities are negative for swelling, cords or tenderness. Labs:   Lab Results   Component Value Date/Time    WBC 9.9 2020 05:13 AM    WBC 9.5 2020 12:34 PM    HGB 9.0 (L) 2020 05:13 AM    HGB 10.8 (L) 2020 12:34 PM    HCT 28.1 (L) 2020 05:13 AM    HCT 33.6 (L) 2020 12:34 PM    PLATELET 320  05:13 AM    PLATELET 196  12:34 PM       No results found for this or any previous visit (from the past 24 hour(s)).

## 2020-07-16 VITALS
BODY MASS INDEX: 40.67 KG/M2 | RESPIRATION RATE: 16 BRPM | DIASTOLIC BLOOD PRESSURE: 95 MMHG | TEMPERATURE: 97.7 F | SYSTOLIC BLOOD PRESSURE: 139 MMHG | HEIGHT: 62 IN | OXYGEN SATURATION: 98 % | HEART RATE: 75 BPM | WEIGHT: 221 LBS

## 2020-07-16 LAB
ABO + RH BLD: NORMAL
BLD PROD TYP BPU: NORMAL
BPU ID: NORMAL
FETAL SCREEN,FMHS: NORMAL
STATUS OF UNIT,%ST: NORMAL
UNIT DIVISION, %UDIV: 0
WEAK D AG RBC QL: NORMAL

## 2020-07-16 PROCEDURE — 74011250637 HC RX REV CODE- 250/637: Performed by: OBSTETRICS & GYNECOLOGY

## 2020-07-16 RX ADMIN — OXYCODONE HYDROCHLORIDE AND ACETAMINOPHEN 1 TABLET: 5; 325 TABLET ORAL at 03:55

## 2020-07-16 RX ADMIN — IBUPROFEN 800 MG: 400 TABLET, FILM COATED ORAL at 03:50

## 2020-07-16 RX ADMIN — OXYCODONE HYDROCHLORIDE AND ACETAMINOPHEN 1 TABLET: 5; 325 TABLET ORAL at 10:37

## 2020-07-16 NOTE — LACTATION NOTE
This note was copied from a baby's chart. Twin infants are nursing well and receiving supplement for wt loss. Babies are syringe feeding and using extra slow flow nipple when parents need a simpler method. Mother shown how to reduce nipple confusion by offering syringe at breast.  Mother's milk is coming in but not yet sufficient to meet both babies needs today. Mother's breasts are heavy with swelling of the areola. Reverse pressure softening, and nipple/breast care reviewed. Parents will receive help from family at home. Mother states that she has no further questions for Lactation Consultant before discharge.

## 2020-07-16 NOTE — ROUTINE PROCESS
Bedside shift change report given to A Shane RN (oncoming nurse) by Rohini Piedra RN (offgoing nurse). Report included the following information SBAR.

## 2020-07-16 NOTE — DISCHARGE INSTRUCTIONS
Patient Education         Section: What to Expect at Home  Your Recovery     A  section, or , is surgery to deliver your baby through a cut, called an incision, that the doctor makes in your lower belly and uterus. You may have some pain in your lower belly and need pain medicine for 1 to 2 weeks. You can expect some vaginal bleeding for several weeks. You will probably need about 6 weeks to fully recover. It is important to take it easy while the incision is healing. Avoid heavy lifting, strenuous activities, or exercises that strain the belly muscles while you are recovering. Ask a family member or friend for help with housework, cooking, and shopping. This care sheet gives you a general idea about how long it will take for you to recover. But each person recovers at a different pace. Follow the steps below to get better as quickly as possible. How can you care for yourself at home? Activity  · Rest when you feel tired. Getting enough sleep will help you recover. · Try to walk each day. Start by walking a little more than you did the day before. Bit by bit, increase the amount you walk. Walking boosts blood flow and helps prevent pneumonia, constipation, and blood clots. · Avoid strenuous activities, such as bicycle riding, jogging, weightlifting, and aerobic exercise, for 6 weeks or until your doctor says it is okay. · Until your doctor says it is okay, do not lift anything heavier than your baby. · Do not do sit-ups or other exercises that strain the belly muscles for 6 weeks or until your doctor says it is okay. · Hold a pillow over your incision when you cough or take deep breaths. This will support your belly and decrease your pain. · You may shower as usual. Pat the incision dry when you are done. · You will have some vaginal bleeding. Wear sanitary pads. Do not douche or use tampons until your doctor says it is okay. · Ask your doctor when you can drive again.   · You will probably need to take at least 6 weeks off work. It depends on the type of work you do and how you feel. · Ask your doctor when it is okay for you to have sex. Diet  · You can eat your normal diet. If your stomach is upset, try bland, low-fat foods like plain rice, broiled chicken, toast, and yogurt. · Drink plenty of fluids (unless your doctor tells you not to). · You may notice that your bowel movements are not regular right after your surgery. This is common. Try to avoid constipation and straining with bowel movements. You may want to take a fiber supplement every day. If you have not had a bowel movement after a couple of days, ask your doctor about taking a mild laxative. · If you are breastfeeding, limit alcohol. Alcohol can cause a lack of energy and other health problems for the baby when a breastfeeding woman drinks heavily. It can also get in the way of a mom's ability to feed her baby or to care for the child in other ways. There isn't a lot of research about exactly how much alcohol can harm a baby. Having no alcohol is the safest choice for your baby. If you choose to have a drink now and then, have only one drink, and limit the number of occasions that you have a drink. Wait to breastfeed at least 2 hours after you have a drink to reduce the amount of alcohol the baby may get in the milk. Medicines  · Your doctor will tell you if and when you can restart your medicines. He or she will also give you instructions about taking any new medicines. · If you take aspirin or some other blood thinner, ask your doctor if and when to start taking it again. Make sure that you understand exactly what your doctor wants you to do. · Take pain medicines exactly as directed. ? If the doctor gave you a prescription medicine for pain, take it as prescribed. ? If you are not taking a prescription pain medicine, ask your doctor if you can take an over-the-counter medicine.   · If you think your pain medicine is making you sick to your stomach:  ? Take your medicine after meals (unless your doctor has told you not to). ? Ask your doctor for a different pain medicine. · If your doctor prescribed antibiotics, take them as directed. Do not stop taking them just because you feel better. You need to take the full course of antibiotics. Incision care  · If you have strips of tape on the incision, leave the tape on for a week or until it falls off. · Wash the area daily with warm, soapy water, and pat it dry. Don't use hydrogen peroxide or alcohol, which can slow healing. You may cover the area with a gauze bandage if it weeps or rubs against clothing. Change the bandage every day. · Keep the area clean and dry. Other instructions  · If you breastfeed your baby, you may be more comfortable while you are healing if you place the baby so that he or she is not resting on your belly. Try tucking your baby under your arm, with his or her body along the side you will be feeding on. Support your baby's upper body with your arm. With that hand you can control your baby's head to bring his or her mouth to your breast. This is sometimes called the football hold. Follow-up care is a key part of your treatment and safety. Be sure to make and go to all appointments, and call your doctor if you are having problems. It's also a good idea to know your test results and keep a list of the medicines you take. When should you call for help? Call  911 anytime you think you may need emergency care. For example, call if:  · You have thoughts of harming yourself, your baby, or another person. · You passed out (lost consciousness). · You have chest pain, are short of breath, or cough up blood. · You have a seizure. Call your doctor now or seek immediate medical care if:  · You have pain that does not get better after you take pain medicine. · You have severe vaginal bleeding.   · You are dizzy or lightheaded, or you feel like you may faint.  · You have new or worse pain in your belly or pelvis. · You have loose stitches, or your incision comes open. · You have symptoms of infection, such as:  ? Increased pain, swelling, warmth, or redness. ? Red streaks leading from the incision. ? Pus draining from the incision. ? A fever. · You have symptoms of a blood clot in your leg (called a deep vein thrombosis), such as:  ? Pain in your calf, back of the knee, thigh, or groin. ? Redness and swelling in your leg or groin. · You have signs of preeclampsia, such as:  ? Sudden swelling of your face, hands, or feet. ? New vision problems (such as dimness, blurring, or seeing spots). ? A severe headache. Watch closely for changes in your health, and be sure to contact your doctor if:  · You do not get better as expected. Where can you learn more? Go to http://raúl-arianne.info/  Enter M806 in the search box to learn more about \" Section: What to Expect at Home. \"  Current as of: 2020               Content Version: 12.5  © 1574-8503 [employer]wise, Incorporated. Care instructions adapted under license by [employer] (which disclaims liability or warranty for this information). If you have questions about a medical condition or this instruction, always ask your healthcare professional. [address] any warranty or liability for your use of this information. Virtual postpartum support group meetings available at www. postpartumva.org

## 2020-07-16 NOTE — PROGRESS NOTES
Post-Operative  Day 3    Edy Edwards       Assessment: Post-Op day 3, doing well    Plan:   1. Discharge home today  2. Follow up in office in 6 weeks with Shahram Woodard MD  3. Post partum activity/wound care advised, diet as tolerated  4. Discharge Medications: pain medication per discharge summary and medications prior to admission  5. GHTN: normotensive, no meds  6. Acute blood loss on chronic anemia: Hgb 10.8->9.0, patient asymptomatic. Plan supplemental iron at discharge    Information for the patient's :  Zan [address] [[id number]]   , Low Transverse   Information for the patient's :  [address]an [address] [[id number]]   , Low Transverse    Patient doing well without significant complaint. Tolerating diet, passing flatus, voiding and ambulating without difficulty    Vitals:  Visit Vitals  /85 (BP 1 Location: Left arm, BP Patient Position: At rest)   Pulse 77   Temp 98.3 °F (36.8 °C)   Resp 16   Ht 5' 2\" (1.575 m)   Wt 100.2 kg (221 lb)   SpO2 98%   Breastfeeding Unknown   BMI 40.42 kg/m²     Temp (24hrs), Av.2 °F (36.8 °C), Min:97.8 °F (36.6 °C), Max:98.5 °F (36.9 °C)        Exam:        Patient without distress. Abdomen, soft, expected tenderness, fundus firm                Wound incision clean, dry and intact               Lower extremities are symmetric without tenderness, cords or erythema. Labs:   Lab Results   Component Value Date/Time    WBC 9.9 2020 05:13 AM    WBC 9.5 2020 12:34 PM    HGB 9.0 (L) 2020 05:13 AM    HGB 10.8 (L) 2020 12:34 PM    HCT 28.1 (L) 2020 05:13 AM    HCT 33.6 (L) 2020 12:34 PM    PLATELET 196  05:13 AM    PLATELET 471  12:34 PM       No results found for this or any previous visit (from the past 24 hour(s)).

## 2020-07-18 ENCOUNTER — HOSPITAL ENCOUNTER (OUTPATIENT)
Age: 30
Setting detail: OBSERVATION
Discharge: HOME | End: 2020-07-20
Attending: OBSTETRICS & GYNECOLOGY | Admitting: OBSTETRICS & GYNECOLOGY
Payer: COMMERCIAL

## 2020-07-18 ENCOUNTER — HOSPITAL ENCOUNTER (EMERGENCY)
Age: 30
Discharge: LEFT BEFORE DISCHARGE | End: 2020-07-18
Payer: COMMERCIAL

## 2020-07-18 VITALS
DIASTOLIC BLOOD PRESSURE: 98 MMHG | SYSTOLIC BLOOD PRESSURE: 154 MMHG | RESPIRATION RATE: 16 BRPM | HEART RATE: 80 BPM | TEMPERATURE: 98.7 F | OXYGEN SATURATION: 100 %

## 2020-07-18 PROBLEM — R51.9 HEADACHE IN PREGNANCY, POSTPARTUM: Status: ACTIVE | Noted: 2020-07-18

## 2020-07-18 LAB
ALBUMIN SERPL-MCNC: 2.5 G/DL (ref 3.5–5)
ALBUMIN/GLOB SERPL: 0.8 {RATIO} (ref 1.1–2.2)
ALP SERPL-CCNC: 127 U/L (ref 45–117)
ALT SERPL-CCNC: 46 U/L (ref 12–78)
ANION GAP SERPL CALC-SCNC: 10 MMOL/L (ref 5–15)
AST SERPL-CCNC: 28 U/L (ref 15–37)
BASOPHILS # BLD: 0 K/UL (ref 0–0.1)
BASOPHILS NFR BLD: 0 % (ref 0–1)
BILIRUB SERPL-MCNC: 0.2 MG/DL (ref 0.2–1)
BUN SERPL-MCNC: 14 MG/DL (ref 6–20)
BUN/CREAT SERPL: 22 (ref 12–20)
CALCIUM SERPL-MCNC: 8 MG/DL (ref 8.5–10.1)
CHLORIDE SERPL-SCNC: 108 MMOL/L (ref 97–108)
CO2 SERPL-SCNC: 23 MMOL/L (ref 21–32)
CREAT SERPL-MCNC: 0.63 MG/DL (ref 0.55–1.02)
CREAT UR-MCNC: <13 MG/DL
DIFFERENTIAL METHOD BLD: ABNORMAL
EOSINOPHIL # BLD: 0.2 K/UL (ref 0–0.4)
EOSINOPHIL NFR BLD: 2 % (ref 0–7)
ERYTHROCYTE [DISTWIDTH] IN BLOOD BY AUTOMATED COUNT: 13.7 % (ref 11.5–14.5)
GLOBULIN SER CALC-MCNC: 3.1 G/DL (ref 2–4)
GLUCOSE SERPL-MCNC: 83 MG/DL (ref 65–100)
HCT VFR BLD AUTO: 30.9 % (ref 35–47)
HGB BLD-MCNC: 9.8 G/DL (ref 11.5–16)
IMM GRANULOCYTES # BLD AUTO: 0.1 K/UL (ref 0–0.04)
IMM GRANULOCYTES NFR BLD AUTO: 1 % (ref 0–0.5)
LYMPHOCYTES # BLD: 1.9 K/UL (ref 0.8–3.5)
LYMPHOCYTES NFR BLD: 18 % (ref 12–49)
MCH RBC QN AUTO: 28.4 PG (ref 26–34)
MCHC RBC AUTO-ENTMCNC: 31.7 G/DL (ref 30–36.5)
MCV RBC AUTO: 89.6 FL (ref 80–99)
MONOCYTES # BLD: 0.7 K/UL (ref 0–1)
MONOCYTES NFR BLD: 7 % (ref 5–13)
NEUTS SEG # BLD: 7.8 K/UL (ref 1.8–8)
NEUTS SEG NFR BLD: 72 % (ref 32–75)
NRBC # BLD: 0 K/UL (ref 0–0.01)
NRBC BLD-RTO: 0 PER 100 WBC
PLATELET # BLD AUTO: 258 K/UL (ref 150–400)
PMV BLD AUTO: 9.4 FL (ref 8.9–12.9)
POTASSIUM SERPL-SCNC: 3.8 MMOL/L (ref 3.5–5.1)
PROT SERPL-MCNC: 5.6 G/DL (ref 6.4–8.2)
PROT UR-MCNC: <5 MG/DL (ref 0–11.9)
PROT/CREAT UR-RTO: NORMAL
RBC # BLD AUTO: 3.45 M/UL (ref 3.8–5.2)
SODIUM SERPL-SCNC: 141 MMOL/L (ref 136–145)
URATE SERPL-MCNC: 5.1 MG/DL (ref 2.6–6)
WBC # BLD AUTO: 10.7 K/UL (ref 3.6–11)

## 2020-07-18 PROCEDURE — 36415 COLL VENOUS BLD VENIPUNCTURE: CPT

## 2020-07-18 PROCEDURE — 99218 HC RM OBSERVATION: CPT

## 2020-07-18 PROCEDURE — 80053 COMPREHEN METABOLIC PANEL: CPT

## 2020-07-18 PROCEDURE — 84550 ASSAY OF BLOOD/URIC ACID: CPT

## 2020-07-18 PROCEDURE — 99285 EMERGENCY DEPT VISIT HI MDM: CPT

## 2020-07-18 PROCEDURE — 75810000275 HC EMERGENCY DEPT VISIT NO LEVEL OF CARE

## 2020-07-18 PROCEDURE — 85025 COMPLETE CBC W/AUTO DIFF WBC: CPT

## 2020-07-18 PROCEDURE — 74011250637 HC RX REV CODE- 250/637: Performed by: OBSTETRICS & GYNECOLOGY

## 2020-07-18 PROCEDURE — 84156 ASSAY OF PROTEIN URINE: CPT

## 2020-07-18 RX ORDER — AMOXICILLIN 250 MG
2 CAPSULE ORAL
Status: DISCONTINUED | OUTPATIENT
Start: 2020-07-18 | End: 2020-07-21 | Stop reason: HOSPADM

## 2020-07-18 RX ORDER — SWAB
1 SWAB, NON-MEDICATED MISCELLANEOUS DAILY
Status: DISCONTINUED | OUTPATIENT
Start: 2020-07-19 | End: 2020-07-21 | Stop reason: HOSPADM

## 2020-07-18 RX ORDER — ACETAMINOPHEN 325 MG/1
975 TABLET ORAL
Status: DISCONTINUED | OUTPATIENT
Start: 2020-07-18 | End: 2020-07-21 | Stop reason: HOSPADM

## 2020-07-18 RX ORDER — LANOLIN ALCOHOL/MO/W.PET/CERES
1 CREAM (GRAM) TOPICAL
Status: DISCONTINUED | OUTPATIENT
Start: 2020-07-19 | End: 2020-07-21 | Stop reason: HOSPADM

## 2020-07-18 RX ORDER — OXYCODONE AND ACETAMINOPHEN 5; 325 MG/1; MG/1
1 TABLET ORAL
Status: DISCONTINUED | OUTPATIENT
Start: 2020-07-18 | End: 2020-07-21 | Stop reason: HOSPADM

## 2020-07-18 RX ORDER — IBUPROFEN 600 MG/1
600 TABLET ORAL
Status: DISCONTINUED | OUTPATIENT
Start: 2020-07-18 | End: 2020-07-21 | Stop reason: HOSPADM

## 2020-07-18 RX ORDER — LABETALOL 200 MG/1
200 TABLET, FILM COATED ORAL EVERY 12 HOURS
Status: DISCONTINUED | OUTPATIENT
Start: 2020-07-18 | End: 2020-07-19

## 2020-07-18 RX ORDER — BUTALBITAL, ACETAMINOPHEN AND CAFFEINE 50; 325; 40 MG/1; MG/1; MG/1
1 TABLET ORAL
Status: DISCONTINUED | OUTPATIENT
Start: 2020-07-18 | End: 2020-07-21 | Stop reason: HOSPADM

## 2020-07-18 RX ADMIN — LABETALOL HYDROCHLORIDE 200 MG: 200 TABLET, FILM COATED ORAL at 21:56

## 2020-07-18 RX ADMIN — BUTALBITAL, ACETAMINOPHEN, AND CAFFEINE 1 TABLET: 50; 325; 40 TABLET ORAL at 21:56

## 2020-07-18 RX ADMIN — IBUPROFEN 600 MG: 600 TABLET, FILM COATED ORAL at 23:32

## 2020-07-19 PROBLEM — O13.9 GESTATIONAL HYPERTENSION: Status: ACTIVE | Noted: 2020-07-19

## 2020-07-19 LAB
ALBUMIN SERPL-MCNC: 2.5 G/DL (ref 3.5–5)
ALBUMIN/GLOB SERPL: 0.7 {RATIO} (ref 1.1–2.2)
ALP SERPL-CCNC: 128 U/L (ref 45–117)
ALT SERPL-CCNC: 44 U/L (ref 12–78)
ANION GAP SERPL CALC-SCNC: 5 MMOL/L (ref 5–15)
AST SERPL-CCNC: 26 U/L (ref 15–37)
BASOPHILS # BLD: 0 K/UL (ref 0–0.1)
BASOPHILS NFR BLD: 0 % (ref 0–1)
BILIRUB SERPL-MCNC: 0.3 MG/DL (ref 0.2–1)
BUN SERPL-MCNC: 11 MG/DL (ref 6–20)
BUN/CREAT SERPL: 18 (ref 12–20)
CALCIUM SERPL-MCNC: 8.1 MG/DL (ref 8.5–10.1)
CHLORIDE SERPL-SCNC: 111 MMOL/L (ref 97–108)
CO2 SERPL-SCNC: 24 MMOL/L (ref 21–32)
CREAT SERPL-MCNC: 0.62 MG/DL (ref 0.55–1.02)
DIFFERENTIAL METHOD BLD: ABNORMAL
EOSINOPHIL # BLD: 0.2 K/UL (ref 0–0.4)
EOSINOPHIL NFR BLD: 2 % (ref 0–7)
ERYTHROCYTE [DISTWIDTH] IN BLOOD BY AUTOMATED COUNT: 13.6 % (ref 11.5–14.5)
GLOBULIN SER CALC-MCNC: 3.7 G/DL (ref 2–4)
GLUCOSE SERPL-MCNC: 76 MG/DL (ref 65–100)
HCT VFR BLD AUTO: 32.1 % (ref 35–47)
HGB BLD-MCNC: 10.2 G/DL (ref 11.5–16)
IMM GRANULOCYTES # BLD AUTO: 0 K/UL (ref 0–0.04)
IMM GRANULOCYTES NFR BLD AUTO: 1 % (ref 0–0.5)
LYMPHOCYTES # BLD: 2.1 K/UL (ref 0.8–3.5)
LYMPHOCYTES NFR BLD: 23 % (ref 12–49)
MCH RBC QN AUTO: 28.7 PG (ref 26–34)
MCHC RBC AUTO-ENTMCNC: 31.8 G/DL (ref 30–36.5)
MCV RBC AUTO: 90.4 FL (ref 80–99)
MONOCYTES # BLD: 0.6 K/UL (ref 0–1)
MONOCYTES NFR BLD: 7 % (ref 5–13)
NEUTS SEG # BLD: 6 K/UL (ref 1.8–8)
NEUTS SEG NFR BLD: 67 % (ref 32–75)
NRBC # BLD: 0 K/UL (ref 0–0.01)
NRBC BLD-RTO: 0 PER 100 WBC
PLATELET # BLD AUTO: 272 K/UL (ref 150–400)
PMV BLD AUTO: 9.3 FL (ref 8.9–12.9)
POTASSIUM SERPL-SCNC: 3.8 MMOL/L (ref 3.5–5.1)
PROT SERPL-MCNC: 6.2 G/DL (ref 6.4–8.2)
RBC # BLD AUTO: 3.55 M/UL (ref 3.8–5.2)
SODIUM SERPL-SCNC: 140 MMOL/L (ref 136–145)
WBC # BLD AUTO: 8.9 K/UL (ref 3.6–11)

## 2020-07-19 PROCEDURE — 80053 COMPREHEN METABOLIC PANEL: CPT

## 2020-07-19 PROCEDURE — 99218 HC RM OBSERVATION: CPT

## 2020-07-19 PROCEDURE — 74011250637 HC RX REV CODE- 250/637: Performed by: OBSTETRICS & GYNECOLOGY

## 2020-07-19 PROCEDURE — 85025 COMPLETE CBC W/AUTO DIFF WBC: CPT

## 2020-07-19 PROCEDURE — 36415 COLL VENOUS BLD VENIPUNCTURE: CPT

## 2020-07-19 RX ORDER — LABETALOL 200 MG/1
200 TABLET, FILM COATED ORAL 3 TIMES DAILY
Status: DISCONTINUED | OUTPATIENT
Start: 2020-07-19 | End: 2020-07-19 | Stop reason: SDUPTHER

## 2020-07-19 RX ORDER — LABETALOL 200 MG/1
200 TABLET, FILM COATED ORAL 3 TIMES DAILY
Qty: 90 TAB | Refills: 1 | Status: SHIPPED | OUTPATIENT
Start: 2020-07-19 | End: 2020-07-20

## 2020-07-19 RX ORDER — LABETALOL 200 MG/1
200 TABLET, FILM COATED ORAL 3 TIMES DAILY
Status: DISCONTINUED | OUTPATIENT
Start: 2020-07-19 | End: 2020-07-20

## 2020-07-19 RX ADMIN — LABETALOL HYDROCHLORIDE 200 MG: 200 TABLET, FILM COATED ORAL at 16:47

## 2020-07-19 RX ADMIN — LABETALOL HYDROCHLORIDE 200 MG: 200 TABLET, FILM COATED ORAL at 21:33

## 2020-07-19 RX ADMIN — LABETALOL HYDROCHLORIDE 200 MG: 200 TABLET, FILM COATED ORAL at 09:10

## 2020-07-19 RX ADMIN — FERROUS SULFATE TAB 325 MG (65 MG ELEMENTAL FE) 325 MG: 325 (65 FE) TAB at 09:10

## 2020-07-19 RX ADMIN — ACETAMINOPHEN 975 MG: 325 TABLET ORAL at 06:11

## 2020-07-19 RX ADMIN — Medication 1 TABLET: at 09:10

## 2020-07-19 NOTE — ED TRIAGE NOTES
Patient arrives ambulatory from home with CC of high blood pressure. Patient is 5 days post partum after a  on Monday, Dr. Whitney Estrada is OBGYN. Pt is having a headaches, BP in triage 178/113.        : RN notified L&D RN, they will take patient upstairs to be seen. Pt transported to L&D by ER RN.

## 2020-07-19 NOTE — PROGRESS NOTES
Gynecology Progress Note    Daria Wiseman    Assesment:   POD6 s/p PLTCS for twins c/b GHTN, now with worsening postpartum hypertension; labetalol started and now with improving BPs normal to mild range now; PreE labs were negative    Plan:   Increase labetalol to 200mg po tid with now dose to better optimize  Continue to monitor BPs and for si/sx of severe preE throughout the day  Will plan to give her 1600 dose of labetalol and check BP after, as long as <150/100 and symptom free ok to discharge home with labetalol and precautions; will f/u this week in office and take BP daily at home    She is without significant complaints. HA and visual changes are improved. Pain controlled on current medication. Voiding without difficulty. Patient is passing flatus. She is is tolerating her diet.     Orders/Charges: High    Vitals:  Visit Vitals  /76 (BP 1 Location: Left arm, BP Patient Position: At rest;Supine)   Pulse 98   Temp 98.1 °F (36.7 °C)   Resp 16   SpO2 99%     Temp (24hrs), Av.6 °F (37 °C), Min:98.1 °F (36.7 °C), Max:99.1 °F (37.3 °C)      Last 24hr Input/Output:    Intake/Output Summary (Last 24 hours) at 2020  Last data filed at 2020 2320  Gross per 24 hour   Intake 240 ml   Output --   Net 240 ml          Exam:  General: alert, cooperative, no distress, appears stated age     Lung: clear to auscultation bilaterally     Heart: regular rate and rhythm, S1, S2 normal, no murmur, click, rub or gallop     Abdomen: abdomen is soft without significant tenderness, masses, organomegaly or guarding; pfannensteil incision is CDI and healing well     Extremities: extremities normal, atraumatic, no cyanosis, 1+ pitting edema noted bilaterally      Labs:   Lab Results   Component Value Date/Time    WBC 8.9 2020 06:00 AM    WBC 10.7 2020 09:33 PM    WBC 9.9 2020 05:13 AM    WBC 9.5 2020 12:34 PM    HGB 10.2 (L) 2020 06:00 AM    HGB 9.8 (L) 2020 09:33 PM    HGB 9.0 (L) 07/14/2020 05:13 AM    HGB 10.8 (L) 07/13/2020 12:34 PM    HCT 32.1 (L) 07/19/2020 06:00 AM    HCT 30.9 (L) 07/18/2020 09:33 PM    HCT 28.1 (L) 07/14/2020 05:13 AM    HCT 33.6 (L) 07/13/2020 12:34 PM    PLATELET 669 88/26/5022 06:00 AM    PLATELET 870 57/33/2577 09:33 PM    PLATELET 462 85/97/4481 05:13 AM    PLATELET 256 51/28/1279 12:34 PM       Recent Results (from the past 24 hour(s))   CBC WITH AUTOMATED DIFF    Collection Time: 07/18/20  9:33 PM   Result Value Ref Range    WBC 10.7 3.6 - 11.0 K/uL    RBC 3.45 (L) 3.80 - 5.20 M/uL    HGB 9.8 (L) 11.5 - 16.0 g/dL    HCT 30.9 (L) 35.0 - 47.0 %    MCV 89.6 80.0 - 99.0 FL    MCH 28.4 26.0 - 34.0 PG    MCHC 31.7 30.0 - 36.5 g/dL    RDW 13.7 11.5 - 14.5 %    PLATELET 529 995 - 892 K/uL    MPV 9.4 8.9 - 12.9 FL    NRBC 0.0 0  WBC    ABSOLUTE NRBC 0.00 0.00 - 0.01 K/uL    NEUTROPHILS 72 32 - 75 %    LYMPHOCYTES 18 12 - 49 %    MONOCYTES 7 5 - 13 %    EOSINOPHILS 2 0 - 7 %    BASOPHILS 0 0 - 1 %    IMMATURE GRANULOCYTES 1 (H) 0.0 - 0.5 %    ABS. NEUTROPHILS 7.8 1.8 - 8.0 K/UL    ABS. LYMPHOCYTES 1.9 0.8 - 3.5 K/UL    ABS. MONOCYTES 0.7 0.0 - 1.0 K/UL    ABS. EOSINOPHILS 0.2 0.0 - 0.4 K/UL    ABS. BASOPHILS 0.0 0.0 - 0.1 K/UL    ABS. IMM. GRANS. 0.1 (H) 0.00 - 0.04 K/UL    DF AUTOMATED     METABOLIC PANEL, COMPREHENSIVE    Collection Time: 07/18/20  9:33 PM   Result Value Ref Range    Sodium 141 136 - 145 mmol/L    Potassium 3.8 3.5 - 5.1 mmol/L    Chloride 108 97 - 108 mmol/L    CO2 23 21 - 32 mmol/L    Anion gap 10 5 - 15 mmol/L    Glucose 83 65 - 100 mg/dL    BUN 14 6 - 20 MG/DL    Creatinine 0.63 0.55 - 1.02 MG/DL    BUN/Creatinine ratio 22 (H) 12 - 20      GFR est AA >60 >60 ml/min/1.73m2    GFR est non-AA >60 >60 ml/min/1.73m2    Calcium 8.0 (L) 8.5 - 10.1 MG/DL    Bilirubin, total 0.2 0.2 - 1.0 MG/DL    ALT (SGPT) 46 12 - 78 U/L    AST (SGOT) 28 15 - 37 U/L    Alk.  phosphatase 127 (H) 45 - 117 U/L    Protein, total 5.6 (L) 6.4 - 8.2 g/dL    Albumin 2.5 (L) 3.5 - 5.0 g/dL    Globulin 3.1 2.0 - 4.0 g/dL    A-G Ratio 0.8 (L) 1.1 - 2.2     URIC ACID    Collection Time: 07/18/20  9:33 PM   Result Value Ref Range    Uric acid 5.1 2.6 - 6.0 MG/DL   PROTEIN/CREATININE RATIO, URINE    Collection Time: 07/18/20  9:33 PM   Result Value Ref Range    Protein, urine random <5 0.0 - 11.9 mg/dL    Creatinine, urine <13.00 mg/dL    Protein/Creat. urine Ratio Cannot be calculated     CBC WITH AUTOMATED DIFF    Collection Time: 07/19/20  6:00 AM   Result Value Ref Range    WBC 8.9 3.6 - 11.0 K/uL    RBC 3.55 (L) 3.80 - 5.20 M/uL    HGB 10.2 (L) 11.5 - 16.0 g/dL    HCT 32.1 (L) 35.0 - 47.0 %    MCV 90.4 80.0 - 99.0 FL    MCH 28.7 26.0 - 34.0 PG    MCHC 31.8 30.0 - 36.5 g/dL    RDW 13.6 11.5 - 14.5 %    PLATELET 904 910 - 373 K/uL    MPV 9.3 8.9 - 12.9 FL    NRBC 0.0 0  WBC    ABSOLUTE NRBC 0.00 0.00 - 0.01 K/uL    NEUTROPHILS 67 32 - 75 %    LYMPHOCYTES 23 12 - 49 %    MONOCYTES 7 5 - 13 %    EOSINOPHILS 2 0 - 7 %    BASOPHILS 0 0 - 1 %    IMMATURE GRANULOCYTES 1 (H) 0.0 - 0.5 %    ABS. NEUTROPHILS 6.0 1.8 - 8.0 K/UL    ABS. LYMPHOCYTES 2.1 0.8 - 3.5 K/UL    ABS. MONOCYTES 0.6 0.0 - 1.0 K/UL    ABS. EOSINOPHILS 0.2 0.0 - 0.4 K/UL    ABS. BASOPHILS 0.0 0.0 - 0.1 K/UL    ABS. IMM. GRANS. 0.0 0.00 - 0.04 K/UL    DF AUTOMATED     METABOLIC PANEL, COMPREHENSIVE    Collection Time: 07/19/20  6:00 AM   Result Value Ref Range    Sodium 140 136 - 145 mmol/L    Potassium 3.8 3.5 - 5.1 mmol/L    Chloride 111 (H) 97 - 108 mmol/L    CO2 24 21 - 32 mmol/L    Anion gap 5 5 - 15 mmol/L    Glucose 76 65 - 100 mg/dL    BUN 11 6 - 20 MG/DL    Creatinine 0.62 0.55 - 1.02 MG/DL    BUN/Creatinine ratio 18 12 - 20      GFR est AA >60 >60 ml/min/1.73m2    GFR est non-AA >60 >60 ml/min/1.73m2    Calcium 8.1 (L) 8.5 - 10.1 MG/DL    Bilirubin, total 0.3 0.2 - 1.0 MG/DL    ALT (SGPT) 44 12 - 78 U/L    AST (SGOT) 26 15 - 37 U/L    Alk.  phosphatase 128 (H) 45 - 117 U/L    Protein, total 6.2 (L) 6.4 - 8.2 g/dL    Albumin 2.5 (L) 3.5 - 5.0 g/dL    Globulin 3.7 2.0 - 4.0 g/dL    A-G Ratio 0.7 (L) 1.1 - 2.2

## 2020-07-19 NOTE — DISCHARGE SUMMARY
Gynecology Discharge Summary     Patient ID:  Evgeny Johnson  [id number]  20 y.o.  1990    Admit date: 7/18/2020    Discharge date: 7/19/2020     Admission Diagnoses:   Patient Active Problem List   Diagnosis Code    Headache in pregnancy, postpartum O90.89, R51    Gestational hypertension O13.9       Discharge Diagnoses: There are no discharge diagnoses documented for the most recent discharge. Patient Active Problem List   Diagnosis Code    Headache in pregnancy, postpartum O90.89, R51    Gestational hypertension O13.9       Procedures for this admission: None    Hospital Course: Pt was admitted for elevated BPs postpartum. Pre E labs were negative. She was started on labetalol with normalization of BPs and resolution of HA. She was discharged home on HD2 and will f/u in office in 1 wk. Disposition: home    Discharged Condition: stable            Patient Instructions:   Current Discharge Medication List      START taking these medications    Details   labetaloL (NORMODYNE) 200 mg tablet Take 1 Tab by mouth three (3) times daily. Qty: 90 Tab, Refills: 1         CONTINUE these medications which have NOT CHANGED    Details   ibuprofen (MOTRIN) 600 mg tablet Take 1 Tab by mouth every six (6) hours as needed for Pain. Qty: 40 Tab, Refills: 0      oxyCODONE-acetaminophen (PERCOCET) 5-325 mg per tablet Take 1 Tab by mouth every four (4) hours as needed for Pain for up to 7 days. Max Daily Amount: 6 Tabs. Indications: pain  Qty: 28 Tab, Refills: 0    Associated Diagnoses: Postoperative pain      ferrous sulfate (Slow Fe) 142 mg (45 mg iron) ER tablet Take 1 Tab by mouth Daily (before breakfast) for 30 days. Qty: 30 Tab, Refills: 0      senna-docusate (PERICOLACE) 8.6-50 mg per tablet Take 2 Tabs by mouth daily as needed for Constipation. Qty: 60 Tab, Refills: 0      prenatal 123/iron/folic/omeg3s (ONE-A-DAY WOMEN'S PRENATAL 1 PO) Take  by mouth.            Activity: No lifting, sex, or Strenuous exercise for 5 weeks. Diet: Regular Diet  Wound Care: Keep wound clean and dry    Follow-up with Dr. Terri Awad in 1 wk.       Signed:  Denise Milton MD  7/19/2020  11:36 AM

## 2020-07-19 NOTE — H&P
Labor and Delivery Admission Note  2020  33 y/o  S/P LTCS for Twins complicated by Gestational Hypertension presented to [facility] on  POD # 5 with C/O headache , Occasional floaters and elevated BPs at home 184/112. Started at 4 pm today after she woke up from a nap. States [facility] was broken today and unsure that contributed to her HA or not. No epigastric pain, no fever, no respiratory symptoms  Minimal vaginal bleeding  Breast feeding her twin boys        Hypertension    Associated symptoms include headaches.    Headache            Chief Complaint   Patient presents with    Hypertension       since 1600    Headache        Past Medical History        Past Medical History:   Diagnosis Date    Anemia      Asthma      Gestational hypertension      Other ill-defined conditions(799.89)       ALLERGIES    Rhesus isoimmunization affecting pregnancy             Past Surgical History         Past Surgical History:   Procedure Laterality Date    HX OTHER SURGICAL   2012     wisdom teeth            No family history on file.     Social History            Socioeconomic History    Marital status: SINGLE       Spouse name: Not on file    Number of children: Not on file    Years of education: Not on file    Highest education level: Not on file   Occupational History    Not on file   Social Needs    Financial resource strain: Not on file    Food insecurity       Worry: Not on file       Inability: Not on file    Transportation needs       Medical: Not on file       Non-medical: Not on file   Tobacco Use    Smoking status: Never Smoker    Smokeless tobacco: Never Used   Substance and Sexual Activity    Alcohol use: No    Drug use: No    Sexual activity: Yes   Lifestyle    Physical activity       Days per week: Not on file       Minutes per session: Not on file    Stress: Not on file   Relationships    Social connections       Talks on phone: Not on file       Gets together: Not on file       Attends Cheondoism service: Not on file       Active member of club or organization: Not on file       Attends meetings of clubs or organizations: Not on file       Relationship status: Not on file    Intimate partner violence       Fear of current or ex partner: Not on file       Emotionally abused: Not on file       Physically abused: Not on file       Forced sexual activity: Not on file   Other Topics Concern     Service Not Asked    Blood Transfusions Not Asked    Caffeine Concern Not Asked    Occupational Exposure Not Asked    Hobby Hazards Not Asked    Sleep Concern Not Asked    Stress Concern Not Asked    Weight Concern Not Asked    Special Diet Not Asked    Back Care Not Asked    Exercise Not Asked    Bike Helmet Not Asked   [address] Not Asked    Self-Exams Not Asked   Social History Narrative    Not on file           ALLERGIES: Tetracyclines and Amoxicillin     Review of Systems   Constitutional: Negative. HENT:        + headache   Eyes: Negative. Respiratory: Negative. Cardiovascular: Negative. Gastrointestinal: Negative. Endocrine: Negative. Genitourinary: Negative. Musculoskeletal: Negative. Skin: Negative. Neurological: Positive for headaches. Hematological: Negative. Psychiatric/Behavioral: Negative.              Vitals:     07/18/20 2109   BP: 146/89   Resp: 18   Temp: 99.1 °F (37.3 °C)             Physical Exam  Vitals signs and nursing note reviewed. Exam conducted with a chaperone present. Constitutional:       Appearance: Normal appearance. HENT:      Head: Normocephalic and atraumatic. Nose: Nose normal.      Mouth/Throat:      Mouth: Mucous membranes are moist.   Eyes:      Pupils: Pupils are equal, round, and reactive to light. Neck:      Musculoskeletal: Normal range of motion and neck supple. Cardiovascular:      Rate and Rhythm: Normal rate and regular rhythm. Pulses: Normal pulses.       Heart sounds: Normal heart sounds. Pulmonary:      Effort: Pulmonary effort is normal.      Breath sounds: Normal breath sounds. Abdominal:      General: Abdomen is flat. Bowel sounds are normal.      Comments: Incision clean and dry   Genitourinary:     Comments: Deferred  Musculoskeletal: Normal range of motion. Skin:     General: Skin is warm. Neurological:      General: No focal deficit present. Mental Status: She is alert and oriented to person, place, and time.       Comments: Bilateral DTRs 2+  No clonus  B/L Pitting edema 1+   Psychiatric:         Mood and Affect: Mood normal.         Behavior: Behavior normal.            MDM  Number of Diagnoses or Management Options  Diagnosis management comments: Postpartum Headache  Gestational Hypertension  Rule out Preeclampsia        Amount and/or Complexity of Data Reviewed  Clinical lab tests: ordered        Admit for Observation  VS every 4 hrs  Repeat labs at 6 am

## 2020-07-19 NOTE — ED TRIAGE NOTES
@9306 Reviewed labs with Dr. Karla Redmond. She would like patient to stay overnight for observation. OK to go out to floor for the night. Per Dr. Karla Redmond OK for JESSICA hose vs SCD and OK to hold on placing saline lock at this time, if patient becomes more symptomatic will consider placing saline lock. @2317 Spoke to floor. OK to bring out in 15 minutes. @1309 TRANSFER - OUT REPORT:    Verbal report given to Danette FLOYD(name) on Carrie Montana  being transferred to [facility]) for routine progression of care       Report consisted of patients Situation, Background, Assessment and   Recommendations(SBAR). Information from the following report(s) SBAR, Procedure Summary, Intake/Output, MAR and Recent Results was reviewed with the receiving nurse. Lines:       Opportunity for questions and clarification was provided.       Patient transported with:   Registered Nurse

## 2020-07-19 NOTE — PROGRESS NOTES
6700  [address] West pt's vitals in anticipation of discharge. BP is 158/101. Pt was sitting in bed, resting. I advised her I cannot send her home with BP still this high. Pt agrees and states \"I don't want to go home like this\". 200mg Labetalol PO given.  Will reassess in 1hr.    Visit Vitals  BP (!) 158/101 (BP 1 Location: Left arm, BP Patient Position: At rest;Sitting)   Pulse 71   Temp 98.2 °F (36.8 °C)   Resp 16   SpO2 98%

## 2020-07-19 NOTE — ED TRIAGE NOTES
@4502 Patient arrived up from ED c/o headache since 1600 unresponsive to motrin. Patient is s/p  section on 20 for twins. Reports some elevated pressures at end of pregnancy. Was never on medications. Was never on MgS04. Reports some lower leg swelling. DTR a bit brisk. Reports \"sparklers\" yesterday but not today.   BP in our ED were severe upon arrival.

## 2020-07-19 NOTE — PROGRESS NOTES
TRANSFER - IN REPORT:    Verbal report received from Yoly Spicer RN(name) on Laymon Oquendo  being received from [facility](unit) for routine progression of care      Report consisted of patients Situation, Background, Assessment and   Recommendations(SBAR). Information from the following report(s) SBAR, Kardex and Intake/Output was reviewed with the receiving nurse. Opportunity for questions and clarification was provided. Assessment completed upon patients arrival to unit and care assumed.

## 2020-07-19 NOTE — PROGRESS NOTES
9837 Verbal shift change report given to Dionne Duarte RN (oncoming nurse) by Armaan Encarnacion RN (offgoing nurse). Report included the following information SBAR.

## 2020-07-19 NOTE — ED PROVIDER NOTES
35 y/o  S/P LTCS for Twins complicated by Gestational Hypertension presented to [facility] on  POD # 5 with C/O headache , Occasional floaters and elevated BPs at home 184/112. Started at 4 pm today after she woke up from a nap. States home [facility] was broken today and unsure that contributed to her HA or not. No epigastric pain, no fever, no respiratory symptoms  Minimal vaginal bleeding  Breast feeding her twin boys      Hypertension    Associated symptoms include headaches. Headache           Chief Complaint   Patient presents with    Hypertension     since 0    Headache       Past Medical History:   Diagnosis Date    Anemia     Asthma     Gestational hypertension     Other ill-defined conditions(799.89)     ALLERGIES    Rhesus isoimmunization affecting pregnancy        Past Surgical History:   Procedure Laterality Date    HX OTHER SURGICAL  2012    wisdom teeth         No family history on file.     Social History     Socioeconomic History    Marital status: SINGLE     Spouse name: Not on file    Number of children: Not on file    Years of education: Not on file    Highest education level: Not on file   Occupational History    Not on file   Social Needs    Financial resource strain: Not on file    Food insecurity     Worry: Not on file     Inability: Not on file    Transportation needs     Medical: Not on file     Non-medical: Not on file   Tobacco Use    Smoking status: Never Smoker    Smokeless tobacco: Never Used   Substance and Sexual Activity    Alcohol use: No    Drug use: No    Sexual activity: Yes   Lifestyle    Physical activity     Days per week: Not on file     Minutes per session: Not on file    Stress: Not on file   Relationships    Social connections     Talks on phone: Not on file     Gets together: Not on file     Attends Sabianist service: Not on file     Active member of club or organization: Not on file     Attends meetings of clubs or organizations: Not on file Relationship status: Not on file    Intimate partner violence     Fear of current or ex partner: Not on file     Emotionally abused: Not on file     Physically abused: Not on file     Forced sexual activity: Not on file   Other Topics Concern     Service Not Asked    Blood Transfusions Not Asked    Caffeine Concern Not Asked    Occupational Exposure Not Asked    Hobby Hazards Not Asked    Sleep Concern Not Asked    Stress Concern Not Asked    Weight Concern Not Asked    Special Diet Not Asked    Back Care Not Asked    Exercise Not Asked    Bike Helmet Not Asked   [address] Not Asked    Self-Exams Not Asked   Social History Narrative    Not on file         ALLERGIES: Tetracyclines and Amoxicillin    Review of Systems   Constitutional: Negative. HENT:        + headache   Eyes: Negative. Respiratory: Negative. Cardiovascular: Negative. Gastrointestinal: Negative. Endocrine: Negative. Genitourinary: Negative. Musculoskeletal: Negative. Skin: Negative. Neurological: Positive for headaches. Hematological: Negative. Psychiatric/Behavioral: Negative. Vitals:    07/18/20 2109   BP: 146/89   Resp: 18   Temp: 99.1 °F (37.3 °C)            Physical Exam  Vitals signs and nursing note reviewed. Exam conducted with a chaperone present. Constitutional:       Appearance: Normal appearance. HENT:      Head: Normocephalic and atraumatic. Nose: Nose normal.      Mouth/Throat:      Mouth: Mucous membranes are moist.   Eyes:      Pupils: Pupils are equal, round, and reactive to light. Neck:      Musculoskeletal: Normal range of motion and neck supple. Cardiovascular:      Rate and Rhythm: Normal rate and regular rhythm. Pulses: Normal pulses. Heart sounds: Normal heart sounds. Pulmonary:      Effort: Pulmonary effort is normal.      Breath sounds: Normal breath sounds. Abdominal:      General: Abdomen is flat.  Bowel sounds are normal. Comments: Incision clean and dry   Genitourinary:     Comments: Deferred  Musculoskeletal: Normal range of motion. Skin:     General: Skin is warm. Neurological:      General: No focal deficit present. Mental Status: She is alert and oriented to person, place, and time.       Comments: Bilateral DTRs 2+  No clonus  B/L Pitting edema 1+   Psychiatric:         Mood and Affect: Mood normal.         Behavior: Behavior normal.          MDM  Number of Diagnoses or Management Options  Diagnosis management comments: Postpartum Headache  Gestational Hypertension  Rule out Preeclampsia       Amount and/or Complexity of Data Reviewed  Clinical lab tests: ordered      Admit for Observation        ED Course as of Jul 18 2133   Sat Jul 18, 2020 2132 PROTEIN/CREATININE RATIO, URINE [SP]   3900 METABOLIC PANEL, COMPREHENSIVE [SP]   2132 CBC WITH AUTOMATED DIFF [SP]   2132 URIC ACID [SP]      ED Course User Index  [SP] Bryan Reevles MD       Procedures    @[facility]@

## 2020-07-20 VITALS
DIASTOLIC BLOOD PRESSURE: 80 MMHG | SYSTOLIC BLOOD PRESSURE: 140 MMHG | RESPIRATION RATE: 14 BRPM | TEMPERATURE: 98.8 F | HEART RATE: 66 BPM | OXYGEN SATURATION: 97 %

## 2020-07-20 PROCEDURE — 74011250637 HC RX REV CODE- 250/637: Performed by: OBSTETRICS & GYNECOLOGY

## 2020-07-20 PROCEDURE — 99218 HC RM OBSERVATION: CPT

## 2020-07-20 RX ORDER — LABETALOL 200 MG/1
400 TABLET, FILM COATED ORAL EVERY 8 HOURS
Qty: 90 TAB | Refills: 0 | Status: ON HOLD | OUTPATIENT
Start: 2020-07-20

## 2020-07-20 RX ORDER — LABETALOL 200 MG/1
400 TABLET, FILM COATED ORAL 3 TIMES DAILY
Status: DISCONTINUED | OUTPATIENT
Start: 2020-07-20 | End: 2020-07-21 | Stop reason: HOSPADM

## 2020-07-20 RX ADMIN — LABETALOL HYDROCHLORIDE 400 MG: 200 TABLET, FILM COATED ORAL at 16:14

## 2020-07-20 RX ADMIN — LABETALOL HYDROCHLORIDE 400 MG: 200 TABLET, FILM COATED ORAL at 21:00

## 2020-07-20 RX ADMIN — Medication 1 TABLET: at 08:55

## 2020-07-20 RX ADMIN — FERROUS SULFATE TAB 325 MG (65 MG ELEMENTAL FE) 325 MG: 325 (65 FE) TAB at 08:55

## 2020-07-20 RX ADMIN — ACETAMINOPHEN 975 MG: 325 TABLET ORAL at 16:14

## 2020-07-20 RX ADMIN — LABETALOL HYDROCHLORIDE 300 MG: 200 TABLET, FILM COATED ORAL at 10:23

## 2020-07-20 NOTE — PHYSICIAN ADVISORY
Letter of Status Determination: Current Status   OBSERVATION is Appropriate         Pt Name:  Hunter Patel   MR#  [id number]   [facility]#   [id number]   Room and Hospital  319/01  @ [facility] hospital   Hospitalization date  7/18/2020  9:10 PM   Current Attending Physician  Gideon Dixon MD   Principal diagnosis  HTN    Clinicals  34 y.o. y.o  female hospitalized with HTN . Pt was admitted for elevated BPs postpartum. Pre E labs were negative. She was started on labetalol with normalization of BPs and resolution of HA. She was discharged home on HD2 and will f/u in office in 1 wk.       [facility] criteria   Does  NOT apply    STATUS DETERMINATION  On the basis of clinical data, available documentaion, we believe that the current status of this patient as OBSERVATION is Appropriate     The final decision of the patient's hospitalization status depends on the attending physician's judgment    Additional comments     Insurance  Payor: Marybel Tafoya / Plan: Juni Ortega PPO / Product Type: PPO /    Insurance Information                CIGNA/BSHSI CIGNA PPO Phone:     Asuncion Caldwell: Aftab Robbins Subscriber#: [id number]    Group#: [id number] Precert#:                    Tiffanie Castro MD  Cell: [phone]  Physician Advisor

## 2020-07-20 NOTE — PROGRESS NOTES
Bedside and Verbal shift change report given to SUNSHINE Mendiola RN (oncoming nurse) by Arminda Schaffer RN (offgoing nurse). Report included the following information MAR and Accordion. 0400 OB Hospitalist made aware of elevated pressures throughout the night.  No new orders will continue to monitor

## 2020-07-20 NOTE — LACTATION NOTE
Patient is a readmitted postpartum mom who delivered 37 week twins one week ago. She is hospitalized for elevated blood pressures. She is double pumping using the [facility] grade pump, obtaining approximately 1-1.5 ounces from each breast. Encouraged mom to pump every 2.5 hours during the day and 3 hours at night. Discussed use of galactagogues to facilitate milk production. Mom states that feeding infants at breast cause her to have anxiety and that she feels better when pumping. Discussed options for feeding infants when she returns home.

## 2020-07-20 NOTE — PROGRESS NOTES
T.O.C. Pt is observation status   Family to provide transport at d/c   Emergency contact: Isac [phone]    CM met with pt at bedside, verified demographics, insurance, PCP and emergency contact. 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 Maternal grandparents are watching infants. CM reminded pt to call insurance company to add infants to policy; pt stated she was going to call today. Pt stated family would provide transport at d/c; has no additional issues or concerns. Sancho Rosa RN    Observation notice provided in writing to patient and/or caregiver as well as verbal explanation of the policy. Patients who are in outpatient status also receive the Observation notice. Sancho Rosa RN    Care Management Interventions  PCP Verified by CM:  Yes  Palliative Care Criteria Met (RRAT>21 & CHF Dx)?: No  MyChart Signup: No  Discharge Durable Medical Equipment: No  Physical Therapy Consult: No  Occupational Therapy Consult: No  Speech Therapy Consult: No  Current Support Network: Lives with Spouse  Confirm Follow Up Transport: Family  The Plan for Transition of Care is Related to the Following Treatment Goals : medically stable  Discharge Location  Discharge Placement: Home with family assistance    Sancho Rosa RN

## 2020-07-20 NOTE — PROGRESS NOTES
Gynecology Progress Note    Aditya Robert    Assesment:   POD 7 s/p 1LTCS for twins, complicated by [facility], admitted with worsening postpartum hypertension. BPs suboptimally controlled on Labetalol 200mg TID, BPs still mildly elevated  -PreE labs normal     Plan:   Increase to Labetalol 300mg PO TID today - patient declined her AM dose until she was seen by a physician, so it will be given now  Monitor BPs - patient does have a BP cuff at home and is comfortable monitoring her BPs. If BPs remain mild range, ok to discharge home later today    BP precautions - call for BPs 160/100 or higher, counseled that she'd need a medication increase and possibly further evaluation  Keep follow-up appt for Thursday of this week    Subjective:  Patient has no complaints today. She denies HA, change in vision, RUQ pain. She declined her AM labetalol because she felt she needed a higher dose or different medication. Her babies are doing well at home with her parents. Her incisional pain is well controlled - still has the aquacel bandage on.         Orders/Charges: Medium    Vitals:  Visit Vitals  BP (!) 150/94 (BP 1 Location: Left arm)   Pulse 67   Temp 98.8 °F (37.1 °C)   Resp 16   SpO2 99%     Temp (24hrs), Av.4 °F (36.9 °C), Min:98.2 °F (36.8 °C), Max:98.8 °F (37.1 °C)      Last 24hr Input/Output:    Intake/Output Summary (Last 24 hours) at 2020 1602  Last data filed at 2020 2131  Gross per 24 hour   Intake 480 ml   Output --   Net 480 ml      Exam:    GEN: NAD, resting comfortably  Pulm: no resp distress  Abd: soft, fundus firm, nontender, aquacel bandage removed, incision intact and dry, no surrounding erythema  Ext: no calf tenderness    Labs:   Lab Results   Component Value Date/Time    WBC 8.9 2020 06:00 AM    WBC 10.7 2020 09:33 PM    WBC 9.9 2020 05:13 AM    WBC 9.5 2020 12:34 PM    HGB 10.2 (L) 2020 06:00 AM    HGB 9.8 (L) 2020 09:33 PM    HGB 9.0 (L) 2020 05:13 AM    HGB 10.8 (L) 07/13/2020 12:34 PM    HCT 32.1 (L) 07/19/2020 06:00 AM    HCT 30.9 (L) 07/18/2020 09:33 PM    HCT 28.1 (L) 07/14/2020 05:13 AM    HCT 33.6 (L) 07/13/2020 12:34 PM    PLATELET 045 88/35/9732 06:00 AM    PLATELET 119 42/87/5243 09:33 PM    PLATELET 377 09/73/7610 05:13 AM    PLATELET 846 71/85/5247 12:34 PM       No results found for this or any previous visit (from the past 24 hour(s)).

## 2020-07-20 NOTE — PROGRESS NOTES
Bedside and Verbal shift change report given to [address] (oncoming nurse) by Oscar Neil RN (offgoing nurse). Report included SBAR, Kardex, Intake/Output, MAR, Recent Results and Progress of Care. I accept this patient to my care at this time. Any subsequent documentation in this Progress Note is intended to be information adjunct to other components of the patient's electronic medical record. Refer to accompanying timestamp(s) for chronology. 8:50 AM:  BP with Dynamap 167/108 and 158/102 repeated. Repeat with manual yields 158/80. Pt requesting to hold labetalol until speaking with doctor; states \"I do not want to keep taking a medicine that isn't working. \"     9:21 AM:  Spoke with Chuck Cruz MD regarding above note. She states she will come speak to patient about medications this morning. Pt informed, she would still like to speak to Chuck Cruz MD prior to beginning recommended increase to Labetalol 300mg TID.     4:07 PM: Pt with BP out of preferred range. MD Chuck Cruz notified. Labetalol titrated to 400mg PO TID. Pt complains of mild headache 2/10 pain with left sided orientation; prn tylenol given. Will recheck vitals in 1 hr.

## 2020-07-20 NOTE — PROGRESS NOTES
Called by RN for /98, labetalol is due now  HR has been normal throughout the day - will increase her labetalol to 400mg PO TID      I went to the room to talk with the patient - I reviewed the options of increasing her labetalol or changing agents. She agreed to a higher dose of labetalol. We also discussed discharge planning. Her BPs are not yet optimally controlled, but she's in a safe range. I think if her BP responds well to this dose, it's ok to send her home as long as she's able to monitor her BPs at home and keep in close contact with 606/706 Leit Arevalo. I also gave her the option to stay overnight and ensure her BPs are stable on this higher dose. Patient decided to watch her BPs for a couple hours after the labetalol, and as long as her BP is <150/100, we'll discharge her home. BP precautions reviewed with patient.

## 2020-07-21 NOTE — PROGRESS NOTES
Bedside and Verbal shift change report given to EVERETT Franco (oncoming nurse) by Maribell Vieyra (offgoing nurse). Report included the following information Kardex, Intake/Output, MAR, Accordion and Recent Results.

## 2022-03-18 PROBLEM — O13.9 GESTATIONAL HYPERTENSION: Status: ACTIVE | Noted: 2020-07-19

## 2022-03-18 PROBLEM — R51.9 HEADACHE IN PREGNANCY, POSTPARTUM: Status: ACTIVE | Noted: 2020-07-18

## 2022-03-23 LAB
CHLAMYDIA, EXTERNAL: NEGATIVE
HBSAG, EXTERNAL: NEGATIVE
HIV, EXTERNAL: NON REACTIVE
N. GONORRHEA, EXTERNAL: NEGATIVE
RUBELLA, EXTERNAL: NORMAL
T. PALLIDUM, EXTERNAL: NON REACTIVE

## 2022-10-17 LAB — GRBS, EXTERNAL: POSITIVE

## 2022-11-03 ENCOUNTER — HOSPITAL ENCOUNTER (INPATIENT)
Age: 32
LOS: 3 days | Discharge: HOME | End: 2022-11-06
Attending: OBSTETRICS & GYNECOLOGY | Admitting: OBSTETRICS & GYNECOLOGY
Payer: COMMERCIAL

## 2022-11-03 ENCOUNTER — HOSPITAL ENCOUNTER (OUTPATIENT)
Age: 32
Discharge: HOME | End: 2022-11-03
Attending: OBSTETRICS & GYNECOLOGY

## 2022-11-03 PROBLEM — O10.919 CHRONIC HYPERTENSION AFFECTING PREGNANCY: Status: ACTIVE | Noted: 2022-11-03

## 2022-11-03 PROBLEM — Z3A.38 38 WEEKS GESTATION OF PREGNANCY: Status: ACTIVE | Noted: 2022-11-03

## 2022-11-03 LAB
ALBUMIN SERPL-MCNC: 2.5 G/DL (ref 3.5–5)
ALBUMIN/GLOB SERPL: 0.7 {RATIO} (ref 1.1–2.2)
ALP SERPL-CCNC: 171 U/L (ref 45–117)
ALT SERPL-CCNC: 20 U/L (ref 12–78)
ANION GAP SERPL CALC-SCNC: 7 MMOL/L (ref 5–15)
AST SERPL-CCNC: 10 U/L (ref 15–37)
BILIRUB SERPL-MCNC: 0.3 MG/DL (ref 0.2–1)
BUN SERPL-MCNC: 8 MG/DL (ref 6–20)
BUN/CREAT SERPL: 15 (ref 12–20)
CALCIUM SERPL-MCNC: 8.7 MG/DL (ref 8.5–10.1)
CHLORIDE SERPL-SCNC: 105 MMOL/L (ref 97–108)
CO2 SERPL-SCNC: 24 MMOL/L (ref 21–32)
CREAT SERPL-MCNC: 0.54 MG/DL (ref 0.55–1.02)
ERYTHROCYTE [DISTWIDTH] IN BLOOD BY AUTOMATED COUNT: 13.2 % (ref 11.5–14.5)
GLOBULIN SER CALC-MCNC: 3.6 G/DL (ref 2–4)
GLUCOSE SERPL-MCNC: 130 MG/DL (ref 65–100)
HCT VFR BLD AUTO: 33.9 % (ref 35–47)
HGB BLD-MCNC: 11.3 G/DL (ref 11.5–16)
MCH RBC QN AUTO: 31.4 PG (ref 26–34)
MCHC RBC AUTO-ENTMCNC: 33.3 G/DL (ref 30–36.5)
MCV RBC AUTO: 94.2 FL (ref 80–99)
NRBC # BLD: 0 K/UL (ref 0–0.01)
NRBC BLD-RTO: 0 PER 100 WBC
PLATELET # BLD AUTO: 191 K/UL (ref 150–400)
PMV BLD AUTO: 9.8 FL (ref 8.9–12.9)
POTASSIUM SERPL-SCNC: 3.7 MMOL/L (ref 3.5–5.1)
PROT SERPL-MCNC: 6.1 G/DL (ref 6.4–8.2)
RBC # BLD AUTO: 3.6 M/UL (ref 3.8–5.2)
SODIUM SERPL-SCNC: 136 MMOL/L (ref 136–145)
WBC # BLD AUTO: 7.4 K/UL (ref 3.6–11)

## 2022-11-03 PROCEDURE — 65270000029 HC RM PRIVATE

## 2022-11-03 PROCEDURE — 74011250636 HC RX REV CODE- 250/636: Performed by: OBSTETRICS & GYNECOLOGY

## 2022-11-03 PROCEDURE — 74011250637 HC RX REV CODE- 250/637: Performed by: STUDENT IN AN ORGANIZED HEALTH CARE EDUCATION/TRAINING PROGRAM

## 2022-11-03 PROCEDURE — 36415 COLL VENOUS BLD VENIPUNCTURE: CPT

## 2022-11-03 PROCEDURE — 74011000250 HC RX REV CODE- 250: Performed by: OBSTETRICS & GYNECOLOGY

## 2022-11-03 PROCEDURE — 85027 COMPLETE CBC AUTOMATED: CPT

## 2022-11-03 PROCEDURE — 80053 COMPREHEN METABOLIC PANEL: CPT

## 2022-11-03 PROCEDURE — 75410000002 HC LABOR FEE PER 1 HR

## 2022-11-03 PROCEDURE — 59200 INSERT CERVICAL DILATOR: CPT

## 2022-11-03 PROCEDURE — 74011000258 HC RX REV CODE- 258: Performed by: OBSTETRICS & GYNECOLOGY

## 2022-11-03 RX ORDER — NALBUPHINE HYDROCHLORIDE 20 MG/ML
10 INJECTION, SOLUTION INTRAMUSCULAR; INTRAVENOUS; SUBCUTANEOUS
Status: CANCELLED | OUTPATIENT
Start: 2022-11-03

## 2022-11-03 RX ORDER — OXYTOCIN/RINGER'S LACTATE 30/500 ML
0-20 PLASTIC BAG, INJECTION (ML) INTRAVENOUS
Status: DISCONTINUED | OUTPATIENT
Start: 2022-11-04 | End: 2022-11-04 | Stop reason: HOSPADM

## 2022-11-03 RX ORDER — OXYTOCIN/RINGER'S LACTATE 30/500 ML
0-20 PLASTIC BAG, INJECTION (ML) INTRAVENOUS
Status: CANCELLED | OUTPATIENT
Start: 2022-11-04

## 2022-11-03 RX ORDER — OXYTOCIN/RINGER'S LACTATE 30/500 ML
87.3 PLASTIC BAG, INJECTION (ML) INTRAVENOUS AS NEEDED
Status: CANCELLED | OUTPATIENT
Start: 2022-11-03

## 2022-11-03 RX ORDER — FENTANYL CITRATE 50 UG/ML
25 INJECTION, SOLUTION INTRAMUSCULAR; INTRAVENOUS
Status: CANCELLED | OUTPATIENT
Start: 2022-11-03

## 2022-11-03 RX ORDER — NALBUPHINE HYDROCHLORIDE 20 MG/ML
10 INJECTION, SOLUTION INTRAMUSCULAR; INTRAVENOUS; SUBCUTANEOUS
Status: DISCONTINUED | OUTPATIENT
Start: 2022-11-03 | End: 2022-11-04 | Stop reason: HOSPADM

## 2022-11-03 RX ORDER — ZOLPIDEM TARTRATE 5 MG/1
5 TABLET ORAL
Status: DISCONTINUED | OUTPATIENT
Start: 2022-11-03 | End: 2022-12-04 | Stop reason: HOSPADM

## 2022-11-03 RX ORDER — SODIUM CHLORIDE, SODIUM LACTATE, POTASSIUM CHLORIDE, CALCIUM CHLORIDE 600; 310; 30; 20 MG/100ML; MG/100ML; MG/100ML; MG/100ML
125 INJECTION, SOLUTION INTRAVENOUS CONTINUOUS
Status: DISCONTINUED | OUTPATIENT
Start: 2022-11-03 | End: 2022-11-06 | Stop reason: HOSPADM

## 2022-11-03 RX ORDER — OXYTOCIN/RINGER'S LACTATE 30/500 ML
10 PLASTIC BAG, INJECTION (ML) INTRAVENOUS AS NEEDED
Status: DISCONTINUED | OUTPATIENT
Start: 2022-11-03 | End: 2022-11-06 | Stop reason: HOSPADM

## 2022-11-03 RX ORDER — SODIUM CHLORIDE 0.9 % (FLUSH) 0.9 %
5-40 SYRINGE (ML) INJECTION EVERY 8 HOURS
Status: CANCELLED | OUTPATIENT
Start: 2022-11-03

## 2022-11-03 RX ORDER — SODIUM CHLORIDE, SODIUM LACTATE, POTASSIUM CHLORIDE, CALCIUM CHLORIDE 600; 310; 30; 20 MG/100ML; MG/100ML; MG/100ML; MG/100ML
125 INJECTION, SOLUTION INTRAVENOUS CONTINUOUS
Status: CANCELLED | OUTPATIENT
Start: 2022-11-03

## 2022-11-03 RX ORDER — TERBUTALINE SULFATE 1 MG/ML
0.25 INJECTION SUBCUTANEOUS AS NEEDED
Status: DISCONTINUED | OUTPATIENT
Start: 2022-11-03 | End: 2022-11-04 | Stop reason: HOSPADM

## 2022-11-03 RX ORDER — SODIUM CHLORIDE 0.9 % (FLUSH) 0.9 %
5-40 SYRINGE (ML) INJECTION AS NEEDED
Status: CANCELLED | OUTPATIENT
Start: 2022-11-03

## 2022-11-03 RX ORDER — OXYTOCIN/RINGER'S LACTATE 30/500 ML
10 PLASTIC BAG, INJECTION (ML) INTRAVENOUS AS NEEDED
Status: CANCELLED | OUTPATIENT
Start: 2022-11-03

## 2022-11-03 RX ORDER — ONDANSETRON 2 MG/ML
4 INJECTION INTRAMUSCULAR; INTRAVENOUS
Status: CANCELLED | OUTPATIENT
Start: 2022-11-03

## 2022-11-03 RX ORDER — OXYTOCIN/RINGER'S LACTATE 30/500 ML
87.3 PLASTIC BAG, INJECTION (ML) INTRAVENOUS AS NEEDED
Status: DISCONTINUED | OUTPATIENT
Start: 2022-11-03 | End: 2022-11-06 | Stop reason: HOSPADM

## 2022-11-03 RX ORDER — FENTANYL CITRATE 50 UG/ML
25 INJECTION, SOLUTION INTRAMUSCULAR; INTRAVENOUS
Status: DISCONTINUED | OUTPATIENT
Start: 2022-11-03 | End: 2022-11-04 | Stop reason: HOSPADM

## 2022-11-03 RX ORDER — SODIUM CHLORIDE 0.9 % (FLUSH) 0.9 %
5-40 SYRINGE (ML) INJECTION AS NEEDED
Status: DISCONTINUED | OUTPATIENT
Start: 2022-11-03 | End: 2022-11-04 | Stop reason: HOSPADM

## 2022-11-03 RX ORDER — SODIUM CHLORIDE 0.9 % (FLUSH) 0.9 %
5-40 SYRINGE (ML) INJECTION EVERY 8 HOURS
Status: DISCONTINUED | OUTPATIENT
Start: 2022-11-03 | End: 2022-11-04 | Stop reason: HOSPADM

## 2022-11-03 RX ORDER — ONDANSETRON 2 MG/ML
4 INJECTION INTRAMUSCULAR; INTRAVENOUS
Status: DISCONTINUED | OUTPATIENT
Start: 2022-11-03 | End: 2022-11-04 | Stop reason: HOSPADM

## 2022-11-03 RX ORDER — TERBUTALINE SULFATE 1 MG/ML
0.25 INJECTION SUBCUTANEOUS AS NEEDED
Status: CANCELLED | OUTPATIENT
Start: 2022-11-03

## 2022-11-03 RX ADMIN — PROMETHAZINE HYDROCHLORIDE 12.5 MG: 25 INJECTION INTRAMUSCULAR; INTRAVENOUS at 21:38

## 2022-11-03 RX ADMIN — CEFAZOLIN 2 G: 1 INJECTION, POWDER, FOR SOLUTION INTRAMUSCULAR; INTRAVENOUS at 23:48

## 2022-11-03 RX ADMIN — NALBUPHINE HYDROCHLORIDE 10 MG: 20 INJECTION, SOLUTION INTRAMUSCULAR; INTRAVENOUS; SUBCUTANEOUS at 21:59

## 2022-11-03 RX ADMIN — SODIUM CHLORIDE, POTASSIUM CHLORIDE, SODIUM LACTATE AND CALCIUM CHLORIDE 125 ML/HR: 600; 310; 30; 20 INJECTION, SOLUTION INTRAVENOUS at 21:12

## 2022-11-03 RX ADMIN — LABETALOL HYDROCHLORIDE 300 MG: 100 TABLET, FILM COATED ORAL at 21:08

## 2022-11-03 NOTE — PROGRESS NOTES
1600: Patient arrived for scheduled induction. 1659: Dr Nikko Eng attempted valente bulb placement. Unable to place. Will give patient a break and try again. 1930: Bedside and Verbal shift change report given to Eleir Manzanares RN (oncoming nurse) by Anthony Llanes RN (offgoing nurse). Report included the following information SBAR, MAR, and Recent Results.

## 2022-11-03 NOTE — H&P
History & Physical    Name: Fred Jenninsg MRN: [id number]  SSN: [id number]    YOB: 1990  Age: 32 y.o. Sex: female      Subjective:     Estimated Date of Delivery: 2022  OB History    Para Term  AB Living   1 1 1     2   SAB IAB Ectopic Molar Multiple Live Births           1 2      # Outcome Date GA Lbr Maury/2nd Weight Sex Delivery Anes PTL Lv   1A Term 20 37w1d  3.215 kg M CS-LTranv Spinal N KHANH   1B Term 20 37w1d  3.05 kg M CS-LTranv Spinal N KHANH       Ms. Zhang Macias is admitted with pregnancy at 45 4/7 wks for induction of labor due to [facility]. Prenatal course is complicated by:  -CHTN: on labetalol 300 mg bid, on baby ASA  -H/o csx x1: G1 for twin gestation, desires TOLAC  -Obesity: pre-pregnancy BMI 44  -H/o COVID during pregnancy  -LGA fetus  -Rh neg: s/p rhogam at 28 wks  -GBS+ status    She denies HA, visual changes, RUQ pain, ctx, vb, lof. +FM. Last GS at 36 wks: EFW 91st%ile, 3349g    Please see prenatal records for details. Past Medical History:   Diagnosis Date    Anemia     Asthma     Gestational hypertension     Gestational hypertension 2020    Other ill-defined conditions(799.89)     ALLERGIES    Rhesus isoimmunization affecting pregnancy      Past Surgical History:   Procedure Laterality Date    HX OTHER SURGICAL  2012    wisdom teeth     Social History     Occupational History    Not on file   Tobacco Use    Smoking status: Never    Smokeless tobacco: Never   Substance and Sexual Activity    Alcohol use: No    Drug use: No    Sexual activity: Yes     No family history on file. Allergies   Allergen Reactions    Tetracyclines Anaphylaxis    Amoxicillin Rash     Prior to Admission medications    Medication Sig Start Date End Date Taking? Authorizing Provider   labetaloL (NORMODYNE) 200 mg tablet Take 2 Tabs by mouth every eight (8) hours.  20   Angelito Orosco MD   ibuprofen (MOTRIN) 600 mg tablet Take 1 Tab by mouth every six (6) hours as needed for Pain. 7/15/20   Valeri Carpio MD   senna-docusate (PERICOLACE) 8.6-50 mg per tablet Take 2 Tabs by mouth daily as needed for Constipation. 7/15/20   Shelia Alba MD   prenatal 123/iron/folic/omeg3s (ONE-A-DAY WOMEN'S PRENATAL 1 PO) Take  by mouth. Provider, Historical        Review of Systems: A comprehensive review of systems was negative except for that written in the History of Present Illness. Objective:     Vitals: There were no vitals filed for this visit. Physical Exam:  Patient without distress. Lung: normal respiratory effort  Abdomen: soft, nontender  Fundus: soft and non tender  Perineum: blood absent, amniotic fluid absent  Cervical Exam: 0/30/-3  Membranes:  Intact  Fetal Heart Rate: Reactive          Prenatal Labs:   Lab Results   Component Value Date/Time    ABO/Rh(D) O NEGATIVE 2020 05:12 AM    Rubella, External Immune 2019 12:00 AM    HBsAg, External Negative 2019 12:00 AM    HIV, External Non Reactive 2019 12:00 AM    Gonorrhea, External Negative 2019 12:00 AM    Chlamydia, External Negative 2019 12:00 AM    ABO,Rh O Negative 2019 12:00 AM    GrBStrep, External Positive 2020 12:00 AM       Impression/Plan:     Active Problems:    * No active hospital problems. *     33 y/o [id number] with IUP at 38 4/7 wks admitted for [address] to [facility]. -Admit to L&D  -Routine admission and [facility] catheter for ripening  -Continue labetalol 300 mg bid  -Plan GBS prophylaxis with ancef (rash with amoxicillin but no issues with ancef previously) to start at midnight  -Pitocin to start at midnight   -R/B/A of TOLAC discussed with patient. Alternatives include elective repeat  delivery. Benefits include shorter recovery time and hospital stay. Risks include uterine rupture which could have catastrophic consequences for patient and fetus, failure of TOLAC and need for  delivery, and risk of perineal lacerations. Risks of  delivery also discussed. Also discussed risks associated with LGA fetus including possible shoulder dystocia Patient verbalized her understanding of all of the above and wishes to proceed with TOLAC.

## 2022-11-03 NOTE — PROGRESS NOTES
Labor Progress Note    S: Patient resting comfortably     O: Visit Vitals  /82   Pulse (!) 107   Temp 98.3 °F (36.8 °C)   Resp 18   SpO2 100%        Patient Vitals for the past 4 hrs:    Mode Fetal Heart Rate Variability Decelerations Accelerations RN Reviewed Strip?   11/03/22 1855 External 135 6-25 BPM None Yes Yes   11/03/22 1800 External 135 6-25 BPM None Yes Yes   11/03/22 1650 External 135 6-25 BPM None Yes Yes       Jul[place]: irregular      SVE: 0.5/30/-3     - CRB placed 60/40       Lupe Mon MD

## 2022-11-03 NOTE — PROGRESS NOTES
1935 Report received from R. 701 [facility] catheter inserted per Dr Miesha Gama 60/40 2055 Dr Taras Favre into see pt discussing plan of care tonight. 11/4/22 0310 Bedside shift change report given to MAYTE Cat Rn (oncoming nurse) by Kim Islas  Rn (offgoing nurse). Report included the following information SBAR, Procedure Summary, MAR, Recent Results, and Med Rec Status.

## 2022-11-04 ENCOUNTER — ANESTHESIA (OUTPATIENT)
Dept: LABOR AND DELIVERY | Age: 32
End: 2022-11-04
Payer: COMMERCIAL

## 2022-11-04 ENCOUNTER — ANESTHESIA EVENT (OUTPATIENT)
Dept: LABOR AND DELIVERY | Age: 32
End: 2022-11-04
Payer: COMMERCIAL

## 2022-11-04 PROCEDURE — 75410000002 HC LABOR FEE PER 1 HR

## 2022-11-04 PROCEDURE — 10H073Z INSERTION OF MONITORING ELECTRODE INTO PRODUCTS OF CONCEPTION, VIA NATURAL OR ARTIFICIAL OPENING: ICD-10-PCS | Performed by: OBSTETRICS & GYNECOLOGY

## 2022-11-04 PROCEDURE — 74011250636 HC RX REV CODE- 250/636: Performed by: OBSTETRICS & GYNECOLOGY

## 2022-11-04 PROCEDURE — 74011250637 HC RX REV CODE- 250/637: Performed by: STUDENT IN AN ORGANIZED HEALTH CARE EDUCATION/TRAINING PROGRAM

## 2022-11-04 PROCEDURE — 4A1HXCZ MONITORING OF PRODUCTS OF CONCEPTION, CARDIAC RATE, EXTERNAL APPROACH: ICD-10-PCS | Performed by: OBSTETRICS & GYNECOLOGY

## 2022-11-04 PROCEDURE — 10907ZC DRAINAGE OF AMNIOTIC FLUID, THERAPEUTIC FROM PRODUCTS OF CONCEPTION, VIA NATURAL OR ARTIFICIAL OPENING: ICD-10-PCS | Performed by: OBSTETRICS & GYNECOLOGY

## 2022-11-04 PROCEDURE — 75410000003 HC RECOV DEL/VAG/CSECN EA 0.5 HR

## 2022-11-04 PROCEDURE — 65410000002 HC RM PRIVATE OB

## 2022-11-04 PROCEDURE — 75410000000 HC DELIVERY VAGINAL/SINGLE

## 2022-11-04 PROCEDURE — 10H07YZ INSERTION OF OTHER DEVICE INTO PRODUCTS OF CONCEPTION, VIA NATURAL OR ARTIFICIAL OPENING: ICD-10-PCS | Performed by: OBSTETRICS & GYNECOLOGY

## 2022-11-04 PROCEDURE — 74011250636 HC RX REV CODE- 250/636: Performed by: STUDENT IN AN ORGANIZED HEALTH CARE EDUCATION/TRAINING PROGRAM

## 2022-11-04 PROCEDURE — 74011250637 HC RX REV CODE- 250/637: Performed by: OBSTETRICS & GYNECOLOGY

## 2022-11-04 PROCEDURE — 74011000250 HC RX REV CODE- 250: Performed by: OBSTETRICS & GYNECOLOGY

## 2022-11-04 PROCEDURE — 0UQGXZZ REPAIR VAGINA, EXTERNAL APPROACH: ICD-10-PCS | Performed by: OBSTETRICS & GYNECOLOGY

## 2022-11-04 PROCEDURE — 00HU33Z INSERTION OF INFUSION DEVICE INTO SPINAL CANAL, PERCUTANEOUS APPROACH: ICD-10-PCS | Performed by: STUDENT IN AN ORGANIZED HEALTH CARE EDUCATION/TRAINING PROGRAM

## 2022-11-04 PROCEDURE — 76060000078 HC EPIDURAL ANESTHESIA

## 2022-11-04 PROCEDURE — 4A1H74Z MONITORING OF PRODUCTS OF CONCEPTION, CARDIAC ELECTRICAL ACTIVITY, VIA NATURAL OR ARTIFICIAL OPENING: ICD-10-PCS | Performed by: OBSTETRICS & GYNECOLOGY

## 2022-11-04 PROCEDURE — 74011000250 HC RX REV CODE- 250: Performed by: STUDENT IN AN ORGANIZED HEALTH CARE EDUCATION/TRAINING PROGRAM

## 2022-11-04 RX ORDER — DOCUSATE SODIUM 100 MG/1
100 CAPSULE, LIQUID FILLED ORAL
Status: DISCONTINUED | OUTPATIENT
Start: 2022-11-04 | End: 2022-11-06 | Stop reason: HOSPADM

## 2022-11-04 RX ORDER — OXYTOCIN/RINGER'S LACTATE 30/500 ML
87.3 PLASTIC BAG, INJECTION (ML) INTRAVENOUS AS NEEDED
Status: DISCONTINUED | OUTPATIENT
Start: 2022-11-04 | End: 2022-11-06 | Stop reason: HOSPADM

## 2022-11-04 RX ORDER — ONDANSETRON 4 MG/1
4 TABLET, ORALLY DISINTEGRATING ORAL
Status: DISCONTINUED | OUTPATIENT
Start: 2022-11-04 | End: 2022-11-06 | Stop reason: HOSPADM

## 2022-11-04 RX ORDER — OXYCODONE AND ACETAMINOPHEN 5; 325 MG/1; MG/1
1 TABLET ORAL
Status: DISCONTINUED | OUTPATIENT
Start: 2022-11-04 | End: 2022-11-06 | Stop reason: HOSPADM

## 2022-11-04 RX ORDER — BUPIVACAINE HYDROCHLORIDE 5 MG/ML
10 INJECTION, SOLUTION EPIDURAL; INTRACAUDAL AS NEEDED
Status: DISCONTINUED | OUTPATIENT
Start: 2022-11-04 | End: 2022-11-04 | Stop reason: HOSPADM

## 2022-11-04 RX ORDER — SIMETHICONE 80 MG
80 TABLET,CHEWABLE ORAL
Status: DISCONTINUED | OUTPATIENT
Start: 2022-11-04 | End: 2022-11-06 | Stop reason: HOSPADM

## 2022-11-04 RX ORDER — NALBUPHINE HYDROCHLORIDE 20 MG/ML
5 INJECTION, SOLUTION INTRAMUSCULAR; INTRAVENOUS; SUBCUTANEOUS
Status: DISCONTINUED | OUTPATIENT
Start: 2022-11-04 | End: 2022-11-04 | Stop reason: HOSPADM

## 2022-11-04 RX ORDER — NORETHINDRONE AND ETHINYL ESTRADIOL 0.5-0.035
10 KIT ORAL ONCE
Status: DISPENSED | OUTPATIENT
Start: 2022-11-04 | End: 2022-11-04

## 2022-11-04 RX ORDER — HYDROCORTISONE 1 %
CREAM (GRAM) TOPICAL AS NEEDED
Status: DISCONTINUED | OUTPATIENT
Start: 2022-11-04 | End: 2022-11-06 | Stop reason: HOSPADM

## 2022-11-04 RX ORDER — FENTANYL/BUPIVACAINE/NS/PF 2-1250MCG
1-16 PREFILLED PUMP RESERVOIR EPIDURAL CONTINUOUS
Status: DISCONTINUED | OUTPATIENT
Start: 2022-11-04 | End: 2022-11-04 | Stop reason: HOSPADM

## 2022-11-04 RX ORDER — FENTANYL CITRATE 50 UG/ML
INJECTION, SOLUTION INTRAMUSCULAR; INTRAVENOUS
Status: COMPLETED | OUTPATIENT
Start: 2022-11-04 | End: 2022-11-04

## 2022-11-04 RX ORDER — SODIUM CHLORIDE 0.9 % (FLUSH) 0.9 %
5-40 SYRINGE (ML) INJECTION AS NEEDED
Status: DISCONTINUED | OUTPATIENT
Start: 2022-11-04 | End: 2022-11-06 | Stop reason: HOSPADM

## 2022-11-04 RX ORDER — AMMONIA 15 % (W/V)
1 AMPUL (EA) INHALATION AS NEEDED
Status: DISCONTINUED | OUTPATIENT
Start: 2022-11-04 | End: 2022-11-06 | Stop reason: HOSPADM

## 2022-11-04 RX ORDER — OXYTOCIN/RINGER'S LACTATE 30/500 ML
10 PLASTIC BAG, INJECTION (ML) INTRAVENOUS AS NEEDED
Status: DISCONTINUED | OUTPATIENT
Start: 2022-11-04 | End: 2022-11-06 | Stop reason: HOSPADM

## 2022-11-04 RX ORDER — LANOLIN ALCOHOL/MO/W.PET/CERES
3 CREAM (GRAM) TOPICAL
Status: DISCONTINUED | OUTPATIENT
Start: 2022-11-04 | End: 2022-11-06 | Stop reason: HOSPADM

## 2022-11-04 RX ORDER — HYDROCORTISONE ACETATE PRAMOXINE HCL 2.5; 1 G/100G; G/100G
CREAM TOPICAL AS NEEDED
Status: DISCONTINUED | OUTPATIENT
Start: 2022-11-04 | End: 2022-11-06 | Stop reason: HOSPADM

## 2022-11-04 RX ORDER — NALOXONE HYDROCHLORIDE 0.4 MG/ML
0.4 INJECTION, SOLUTION INTRAMUSCULAR; INTRAVENOUS; SUBCUTANEOUS AS NEEDED
Status: DISCONTINUED | OUTPATIENT
Start: 2022-11-04 | End: 2022-11-04 | Stop reason: HOSPADM

## 2022-11-04 RX ORDER — IBUPROFEN 400 MG/1
800 TABLET ORAL EVERY 8 HOURS
Status: DISCONTINUED | OUTPATIENT
Start: 2022-11-05 | End: 2022-11-06 | Stop reason: HOSPADM

## 2022-11-04 RX ORDER — NORETHINDRONE AND ETHINYL ESTRADIOL 0.5-0.035
KIT ORAL
Status: DISPENSED
Start: 2022-11-04 | End: 2022-11-04

## 2022-11-04 RX ORDER — BUPIVACAINE HYDROCHLORIDE 5 MG/ML
INJECTION, SOLUTION EPIDURAL; INTRACAUDAL
Status: COMPLETED
Start: 2022-11-04 | End: 2022-11-04

## 2022-11-04 RX ORDER — OXYCODONE AND ACETAMINOPHEN 5; 325 MG/1; MG/1
2 TABLET ORAL
Status: DISCONTINUED | OUTPATIENT
Start: 2022-11-04 | End: 2022-11-06 | Stop reason: HOSPADM

## 2022-11-04 RX ORDER — FENTANYL CITRATE 50 UG/ML
100 INJECTION, SOLUTION INTRAMUSCULAR; INTRAVENOUS ONCE
Status: DISPENSED | OUTPATIENT
Start: 2022-11-04 | End: 2022-11-04

## 2022-11-04 RX ORDER — SODIUM CHLORIDE 0.9 % (FLUSH) 0.9 %
5-40 SYRINGE (ML) INJECTION EVERY 8 HOURS
Status: DISCONTINUED | OUTPATIENT
Start: 2022-11-05 | End: 2022-11-06 | Stop reason: HOSPADM

## 2022-11-04 RX ORDER — BUPIVACAINE HYDROCHLORIDE 5 MG/ML
INJECTION, SOLUTION EPIDURAL; INTRACAUDAL
Status: COMPLETED | OUTPATIENT
Start: 2022-11-04 | End: 2022-11-04

## 2022-11-04 RX ORDER — DIPHENHYDRAMINE HCL 25 MG
25 CAPSULE ORAL
Status: DISCONTINUED | OUTPATIENT
Start: 2022-11-04 | End: 2022-11-06 | Stop reason: HOSPADM

## 2022-11-04 RX ORDER — ACETAMINOPHEN 325 MG/1
650 TABLET ORAL
Status: DISCONTINUED | OUTPATIENT
Start: 2022-11-04 | End: 2022-11-06 | Stop reason: HOSPADM

## 2022-11-04 RX ORDER — LIDOCAINE HYDROCHLORIDE AND EPINEPHRINE 15; 5 MG/ML; UG/ML
1.5 INJECTION, SOLUTION EPIDURAL AS NEEDED
Status: DISCONTINUED | OUTPATIENT
Start: 2022-11-04 | End: 2022-11-04 | Stop reason: HOSPADM

## 2022-11-04 RX ADMIN — SODIUM CHLORIDE, POTASSIUM CHLORIDE, SODIUM LACTATE AND CALCIUM CHLORIDE 125 ML/HR: 600; 310; 30; 20 INJECTION, SOLUTION INTRAVENOUS at 09:57

## 2022-11-04 RX ADMIN — CEFAZOLIN 1 G: 1 INJECTION, POWDER, FOR SOLUTION INTRAMUSCULAR; INTRAVENOUS at 08:50

## 2022-11-04 RX ADMIN — FENTANYL CITRATE 100 MCG: 50 INJECTION, SOLUTION INTRAMUSCULAR; INTRAVENOUS at 10:30

## 2022-11-04 RX ADMIN — Medication 10 ML/HR: at 19:05

## 2022-11-04 RX ADMIN — LABETALOL HYDROCHLORIDE 300 MG: 100 TABLET, FILM COATED ORAL at 09:30

## 2022-11-04 RX ADMIN — BUPIVACAINE HYDROCHLORIDE 5 MG: 5 INJECTION, SOLUTION EPIDURAL; INTRACAUDAL; PERINEURAL at 17:10

## 2022-11-04 RX ADMIN — BUPIVACAINE HYDROCHLORIDE 5 MG: 5 INJECTION, SOLUTION EPIDURAL; INTRACAUDAL; PERINEURAL at 17:18

## 2022-11-04 RX ADMIN — CEFAZOLIN 1 G: 1 INJECTION, POWDER, FOR SOLUTION INTRAMUSCULAR; INTRAVENOUS at 16:30

## 2022-11-04 RX ADMIN — LABETALOL HYDROCHLORIDE 300 MG: 100 TABLET, FILM COATED ORAL at 21:46

## 2022-11-04 RX ADMIN — OXYTOCIN 2 MILLI-UNITS/MIN: 10 INJECTION INTRAVENOUS at 00:05

## 2022-11-04 RX ADMIN — OXYTOCIN 14 MILLI-UNITS/MIN: 10 INJECTION INTRAVENOUS at 09:55

## 2022-11-04 RX ADMIN — Medication 10 ML/HR: at 11:00

## 2022-11-04 RX ADMIN — SODIUM CHLORIDE, POTASSIUM CHLORIDE, SODIUM LACTATE AND CALCIUM CHLORIDE 125 ML/HR: 600; 310; 30; 20 INJECTION, SOLUTION INTRAVENOUS at 10:40

## 2022-11-04 RX ADMIN — SALINE NASAL SPRAY 2 SPRAY: 1.5 SOLUTION NASAL at 22:30

## 2022-11-04 RX ADMIN — SODIUM CHLORIDE, PRESERVATIVE FREE 10 ML: 5 INJECTION INTRAVENOUS at 09:59

## 2022-11-04 RX ADMIN — SODIUM CHLORIDE, POTASSIUM CHLORIDE, SODIUM LACTATE AND CALCIUM CHLORIDE 999 ML/HR: 600; 310; 30; 20 INJECTION, SOLUTION INTRAVENOUS at 16:10

## 2022-11-04 RX ADMIN — FENTANYL CITRATE 100 MCG: 50 INJECTION, SOLUTION INTRAMUSCULAR; INTRAVENOUS at 17:23

## 2022-11-04 RX ADMIN — NALBUPHINE HYDROCHLORIDE 10 MG: 20 INJECTION, SOLUTION INTRAMUSCULAR; INTRAVENOUS; SUBCUTANEOUS at 07:26

## 2022-11-04 RX ADMIN — BUPIVACAINE HYDROCHLORIDE 15 MG: 5 INJECTION, SOLUTION EPIDURAL; INTRACAUDAL; PERINEURAL at 10:30

## 2022-11-04 NOTE — PROGRESS NOTES
Labor Note    S: Pt doing well overall, feeling some pressure intermittently. O:  Visit Vitals  BP (!) 98/49 (BP 1 Location: Left arm, BP Patient Position: Lying right side)   Pulse 94   Temp 98.3 °F (36.8 °C)   Resp 16   SpO2 98%     Gen- NAD  Abdomen- soft, gravid, NT  Cervix- 6/80/-1  AROM performed with return of clear fluid  Baby difficult to monitor with EFM so FSE and IUPC placed  FHTs- category 1, some early decels noted  [place]- ctx q3-4 min, pit at 12 mU/min    A/P: 33 y/o [id number] with IUP at 38 4/7 wks admitted for TOLAC IOL secondary to [facility]. GBS+.   -Maternal well-being: VSS  -Fetal well-being: CEFM, cat 1 tracing, ancef for GBS ppx  -Labor: Progress from last exam, continue pit

## 2022-11-04 NOTE — PROGRESS NOTES
0315: Bedside and Verbal shift change report given to Bertha Arambula RN (oncoming nurse) by Gloria Larsen RN (offgoing nurse). Report included the following information SBAR, Kardex, Intake/Output, MAR, and Recent Results. 0345: Assessment complete. Patient resting comfortably at this time. Patient positive for fetal movement and mild contractions. Negative for vaginal bleeding or discharge, visual disturbances, RUQ pain, or headaches at this time. VSS. Patient oriented to room and call bell within reach. 0500: Patient resting comfortably at this time. 0600: Patient resting comfortably at this time. 9770: Nubain given for pain. (See MAR). 0730: Bedside and Verbal shift change report given to Maricruz Cortes RN  (oncoming nurse) by Bertha Arambula RN  (offgoing nurse). Report included the following information SBAR, Kardex, Intake/Output, MAR, and Recent Results. 3630: Smith bulb removed with gentle traction by RN.

## 2022-11-04 NOTE — ANESTHESIA PREPROCEDURE EVALUATION
Relevant Problems   CARDIOVASCULAR   (+) Chronic hypertension affecting pregnancy   (+) Gestational hypertension       Anesthetic History   No history of anesthetic complications            Review of Systems / Medical History  Patient summary reviewed, nursing notes reviewed and pertinent labs reviewed    Pulmonary            Asthma        Neuro/Psych   Within defined limits           Cardiovascular    Hypertension              Exercise tolerance: >4 METS     GI/Hepatic/Renal  Within defined limits              Endo/Other  Within defined limits           Other Findings              Physical Exam    Airway  Mallampati: II  TM Distance: 4 - 6 cm  Neck ROM: normal range of motion   Mouth opening: Normal     Cardiovascular    Rhythm: regular  Rate: normal         Dental  No notable dental hx       Pulmonary  Breath sounds clear to auscultation               Abdominal  GI exam deferred       Other Findings            Anesthetic Plan    ASA: 2  Anesthesia type: epidural          Induction: Intravenous  Anesthetic plan and risks discussed with: Patient

## 2022-11-04 NOTE — PROGRESS NOTES
0730 Bedside and Verbal shift change report received from MAYTE Cat RN. Report included the following information SBAR, MAR, and Recent Results. 0745 Patient tolerating contractions better after Nubain, Denies headache, visual disturbance or epigastric pain  0750 Difficulty maintaining continous FHR tracing, frequently adjusted  0800 Dr. David Owens at bedside, viewing FHR tracing  0802 SVE by Dr. David Owens,  5/70/-3, AROM small amount clear fluid  0955 IV left hand patent but repeatedly occluding, IV restarted # 20 g to right posterior forearm, site intact. IV left hand saline flushed.   1014 Uncomfortable with contractions, desires epidural, IVF bolus started  1020 Positioned to sitting for epidural  1025 Dr. Martín Salinas at bedside, Epidural intra-procedural time out done, epidural started  1027 Oxytocin off for epidural  1036 Epidural placed  1040 Repositioned to left side  1045 Oxytocin restarted at 7 mu/min  1105 Comfortable with contractions, repositioned to right side  1202 Repositioned to left side  1458 Straight cath for 1000 ml clear yellow urine  1550 Complains of vaginal discomfort with contractions,  SVE 5-6/80/-2  Epidural PCEA bolus  1557 Sidelying pelvic release left side  1600 IVF bolus started, still uncomfortable with contractions, Epidural PCEA bolus  1607 Repositioned to right side, Dr. David Owens at bedside, viewing FHR tracing , increased bloody mucus show  1610 Sidelying pelvic release right side  1620 Dr. David Owens at bedside, SVE 6/80/-1  1625 Peanut shaped birthing ball placed between knees  1630 Increased vaginal discomfort with contractions, Anesthesia requested for epidural bolus  1650 Reviewed FHT tracing with Dr. David Owens, 1652 Oxytocin decreased to 6 mu  1705 Oxytocin off  1710 Dr. Chase Berry at bedside for epidural bolus 0.5%  marcaine 5 ml given  1718 Dr. Chase Berry at bedside to assess patients comfort, epidural bolus 0.5% marcaine 5 ml given  1723 Epidural bolus Dr. Chase Berry, Fentanyl 100 mcg given  1800 Comfortable with contractions  1915 Straight cath for 400 ml clear yellow urine  1921 Repositioned to right side  1930 Complains of increased vaginal pressure, SVE anterior lip  1935 Bedside and Verbal shift change report given to T. Duane Chough, RN by Herb Macias, [facility] . Report included the following information SBAR, Intake/Output, MAR, and Recent Results.

## 2022-11-04 NOTE — PROGRESS NOTES
Labor Note    S: Pt doing well overall, feeling ctx. O:  Visit Vitals  /65 (BP 1 Location: Left arm, BP Patient Position: At rest)   Pulse 80   Temp 98.1 °F (36.7 °C)   Resp 16   SpO2 100%     Gen- NAD  Abdomen- soft, gravid, NT  Cervix- 4-5/70/-2  AROM performed with return of clear fluid  Baby difficult to monitor with EFM so FSE and IUPC placed  FHTs- category 1  [place]- ctx q4 min, pit at 12 mU/min    A/P: 31 y/o [id number] with IUP at 38 4/7 wks admitted for TOLAC IOL secondary to [facility]. GBS+.   -Maternal well-being: VSS  -Fetal well-being: STEVENM, cat 1 tracing, ancef for GBS ppx  -Labor: Good response to cook, continue pit, s/p AROM

## 2022-11-04 NOTE — PROGRESS NOTES
Labor Note    S: Pt doing well overall s/p epidural.     O:  Visit Vitals  BP (!) 108/51 (BP 1 Location: Left arm, BP Patient Position: Lying right side)   Pulse 95   Temp 97.7 °F (36.5 °C)   Resp 16   SpO2 100%     Gen- NAD  Abdomen- soft, gravid, NT  Cervix- 4-5/70/-2 (unchanged)  AROM performed with return of clear fluid  FSE and IUPC in place  FHTs- category 1  [place]- ctx q4 min, pit at 11 mU/min    A/P: 33 y/o [id number] with IUP at 38 4/7 wks admitted for TOLAC IOL secondary to [facility]. GBS+.   -Maternal well-being: VSS  -Fetal well-being: AVERY, cat 1 tracing, ancef for GBS ppx  -Labor: Continue pit

## 2022-11-04 NOTE — ANESTHESIA PROCEDURE NOTES
Epidural Block    Patient location during procedure: OB  Start time: 11/4/2022 10:30 AM  End time: 11/4/2022 10:42 AM  Reason for block: labor epidural  Staffing  Performed: attending   Anesthesiologist: Sabine Rosa MD  Preanesthetic Checklist  Completed: patient identified, IV checked, site marked, risks and benefits discussed, surgical consent, monitors and equipment checked, pre-op evaluation and timeout performed  Block Placement  Patient position: sitting  Prep: ChloraPrep and DuraPrep  Sterility prep: cap, drape, gloves and mask  Sedation level: no sedation  Patient monitoring: continuous pulse oximetry and heart rate  Approach: midline  Location: lumbar  Lumbar location: L2-L3  Epidural  Loss of resistance technique: saline  Guidance: landmark technique  Needle  Needle type: Tuohy   Needle gauge: 17 G  Needle length: 9 cm  Needle insertion depth: 7 cm  Catheter type: end hole  Catheter size: 19 G  Catheter at skin depth: 12 cm  Catheter securement method: clear occlusive dressing, liquid medical adhesive and surgical tape  Test dose: negative  Medications Administered  fentaNYL citrate (PF) injection sedation initial - IntraVENous   100 mcg - 11/4/2022 10:30:00 AM  bupivacaine (PF) (MARCAINE) 0.5 % (5 mg/mL) Epidural - Epidural   15 mg - 11/4/2022 10:30:00 AM  Assessment  Number of attempts: 2  Procedure assessment: patient tolerated procedure well with no immediate complications

## 2022-11-05 PROCEDURE — 74011250637 HC RX REV CODE- 250/637: Performed by: STUDENT IN AN ORGANIZED HEALTH CARE EDUCATION/TRAINING PROGRAM

## 2022-11-05 PROCEDURE — 85461 HEMOGLOBIN FETAL: CPT

## 2022-11-05 PROCEDURE — 65410000002 HC RM PRIVATE OB

## 2022-11-05 PROCEDURE — 36415 COLL VENOUS BLD VENIPUNCTURE: CPT

## 2022-11-05 PROCEDURE — 74011250637 HC RX REV CODE- 250/637: Performed by: OBSTETRICS & GYNECOLOGY

## 2022-11-05 PROCEDURE — 74011250636 HC RX REV CODE- 250/636: Performed by: OBSTETRICS & GYNECOLOGY

## 2022-11-05 PROCEDURE — 86900 BLOOD TYPING SEROLOGIC ABO: CPT

## 2022-11-05 RX ORDER — IBUPROFEN 800 MG/1
800 TABLET ORAL EVERY 8 HOURS
Qty: 30 TABLET | Refills: 0 | Status: SHIPPED | OUTPATIENT
Start: 2022-11-05

## 2022-11-05 RX ORDER — LABETALOL 200 MG/1
300 TABLET, FILM COATED ORAL 2 TIMES DAILY
Qty: 60 TABLET | Refills: 1 | Status: SHIPPED | OUTPATIENT
Start: 2022-11-05

## 2022-11-05 RX ADMIN — IBUPROFEN 800 MG: 400 TABLET, FILM COATED ORAL at 17:04

## 2022-11-05 RX ADMIN — IBUPROFEN 800 MG: 400 TABLET, FILM COATED ORAL at 08:45

## 2022-11-05 RX ADMIN — IBUPROFEN 800 MG: 400 TABLET, FILM COATED ORAL at 00:05

## 2022-11-05 RX ADMIN — LABETALOL HYDROCHLORIDE 300 MG: 100 TABLET, FILM COATED ORAL at 08:45

## 2022-11-05 RX ADMIN — HUMAN RHO(D) IMMUNE GLOBULIN 0.3 MG: 300 INJECTION, SOLUTION INTRAMUSCULAR at 17:20

## 2022-11-05 RX ADMIN — LABETALOL HYDROCHLORIDE 300 MG: 100 TABLET, FILM COATED ORAL at 21:00

## 2022-11-05 NOTE — PROGRESS NOTES
Bedside and Verbal shift change report given to Precy Joaquin and INDU Pruett (oncoming nurse) by D. Seaver (offgoing nurse). Report included the following information SBAR, Kardex, Intake/Output, and MAR.     1950: Breann Cole MD at bedside. DARRICK, 10/100/+1. : Started pushing. : Live female born  APGARS 9/9.   2235: Straight cath 150mL. TRANSFER - OUT REPORT:    Verbal report given to John Cannon (name) on Emily Khan  being transferred to MIU (unit) for routine progression of care       Report consisted of patients Situation, Background, Assessment and   Recommendations(SBAR). Information from the following report(s) SBAR, Kardex, Intake/Output, and MAR was reviewed with the receiving nurse. Lines:   Peripheral IV 22 Anterior;Distal;Right Forearm (Active)   Site Assessment Clean;Dry; Intact 22 0955   Phlebitis Assessment 0 22 0955   Infiltration Assessment 0 22 0955   Dressing Status Clean, dry, & intact 22 0955   Dressing Type Transparent;Tape 22 0955   Hub Color/Line Status Pink 22 09   Alcohol Cap Used Yes 22 09        Opportunity for questions and clarification was provided.       Patient transported with:   Registered Nurse

## 2022-11-05 NOTE — DISCHARGE INSTRUCTIONS
Depression After Childbirth: Care Instructions  It's common to lose sleep, feel irritable, and cry easily during the first few days after childbirth. Hormone changes and the demands of a new baby can cause these \"baby blues. \" If these mood changes last more than 2 weeks, you may have postpartum depression. This is a medical condition that requires treatment. If you have any of these signs, you may be depressed. See your doctor right away. You feel very sad or hopeless and lose interest in daily activities. You sleep too much or not enough. You feel tired or as if you have no energy. You eat too much or too little. You write or talk about death. Where to get help 24 hours a day, 7 days a week   If you or someone you know talks about suicide, self-harm, a mental health crisis, a substance use crisis, or any other kind of emotional distress, get help right away. You can:   Call the Suicide and Crisis Lifeline at 65. Call 7-849-117-YFGZ (). 3-633.422.2203    Text HOME to 317757 to access the Crisis Text Line. Consider saving these numbers in your phone. What you can do   Try to go to all of your counseling sessions. Take medicines as directed. Eat healthy foods. Get daily exercise, such as walks. Try to get some sunlight every day. Avoid using alcohol or other substances. Get as much rest as possible. Connect with friends, and join a support group for new parents. When should you call for help? Call 827 if: You feel you cannot stop from hurting yourself, your baby, or someone else. Call your doctor now or seek immediate medical care if:    You are having trouble caring for yourself or your baby. You hear voices. Contact your doctor if:    You have problems with your medicines. You do not get better as expected. Follow-up care is a key part of your treatment and safety.  Be sure to make and go to all appointments, and call your doctor if you are having problems. It's also a good idea to know your test results and keep a list of the medicines you take. Where can you learn more? Go to http://www.gray.com/  Enter [id number] in the search box to learn more about \"Depression After Childbirth: Care Instructions. \"  Current as of: 2022               Content Version: 13.4   amBX. Care instructions adapted under license by [employer] (which disclaims liability or warranty for this information). If you have questions about a medical condition or this instruction, always ask your healthcare professional. [place] [address] any warranty or liability for your use of this information. Postpartum: Care Instructions  Overview  After childbirth (postpartum period), your body goes through many changes. Some of these changes happen over several weeks. In the hours after delivery, your body will begin to recover from childbirth while it prepares to breastfeed your . You may feel emotional during this time. Your hormones can shift your mood without warning for no clear reason. In the first couple of weeks after childbirth, it's common to have emotions that change from happy to sad. You may find it hard to sleep. You may cry a lot. This is called the \"baby blues. \" These overwhelming emotions often go away within a couple of days or weeks. But it's important to discuss your feelings with your doctor. It's easy to get too tired and overwhelmed during the first weeks after childbirth. Don't try to do too much. Get rest whenever you can, accept help from others, and eat well and drink plenty of fluids. In the first couple of weeks after you give birth, your doctor or midwife may want to check in with you and make a plan for any follow-up care you may need. You will likely have a complete postpartum visit in the first 3 months after delivery.  At that time, your doctor or midwife will check on your recovery from childbirth and see how you're doing with your emotions. You may also discuss your concerns or questions. Follow-up care is a key part of your treatment and safety. Be sure to make and go to all appointments, and call your doctor if you are having problems. It's also a good idea to know your test results and keep a list of the medicines you take. How can you care for yourself at home? Sleep or rest when your baby sleeps. Get help with household chores from family or friends, if you can. Don't try to do it all yourself. If you have hemorrhoids or swelling or pain around the opening of your vagina, try using cold and heat. You can put ice or a cold pack on the area for 10 to 20 minutes at a time. Put a thin cloth between the ice and your skin. Also try sitting in a few inches of warm water (sitz bath) 3 times a day and after bowel movements. Take pain medicines exactly as directed. If the doctor gave you a prescription medicine for pain, take it as prescribed. If you are not taking a prescription pain medicine, ask your doctor if you can take an over-the-counter medicine. Eat more fiber to avoid constipation. Include foods such as whole-grain breads and cereals, raw vegetables, raw and dried fruits, and beans. Drink plenty of fluids. If you have kidney, heart, or liver disease and have to limit fluids, talk with your doctor before you increase the amount of fluids you drink. Do not rinse inside your vagina with fluids (douche). If you have stitches, keep the area clean by pouring or spraying warm water over the area outside your vagina and anus after you use the toilet. Keep a list of questions to ask your doctor or midwife. Your questions might be about:  Changes in your breasts, such as lumps or soreness. When to expect your menstrual period to start again. What form of birth control is best for you. Weight you have put on during the pregnancy.   Exercise options. What foods and drinks are best for you, especially if you are breastfeeding. Problems you might be having with breastfeeding. When you can have sex. You may want to talk about lubricants for your vagina. Any feelings of sadness or restlessness that you are having. When should you call for help? Share this information with your partner, family, or a friend. They can help you watch for warning signs. Call 911  anytime you think you may need emergency care. For example, call if:    You have thoughts of harming yourself, your baby, or another person. You passed out (lost consciousness). You have chest pain, are short of breath, or cough up blood. You have a seizure. Call your doctor now or seek immediate medical care if:    You have signs of hemorrhage (too much bleeding), such as:  Heavy vaginal bleeding. This means that you are soaking through one or more pads in an hour. Or you pass blood clots bigger than an egg. Feeling dizzy or lightheaded, or you feel like you may faint. Feeling so tired or weak that you cannot do your usual activities. A fast or irregular heartbeat. New or worse belly pain. You have signs of infection, such as:  A fever. Vaginal discharge that smells bad. New or worse belly pain. You have symptoms of a blood clot in your leg (called a deep vein thrombosis), such as:  Pain in the calf, back of the knee, thigh, or groin. Redness and swelling in your leg or groin. You have signs of preeclampsia, such as:  Sudden swelling of your face, hands, or feet. New vision problems (such as dimness, blurring, or seeing spots). A severe headache. Watch closely for changes in your health, and be sure to contact your doctor if:    Your vaginal bleeding isn't decreasing. You feel sad, anxious, or hopeless for more than a few days. You are having problems with your breasts or breastfeeding. Where can you learn more?   Go to http://www.gray.com/  Enter H468 in the search box to learn more about \"Postpartum: Care Instructions. \"  Current as of: February 23, 2022               Content Version: 13.4  © 2312-5327 [employer]. Care instructions adapted under license by [employer] (which disclaims liability or warranty for this information). If you have questions about a medical condition or this instruction, always ask your healthcare professional. [place] [address] any warranty or liability for your use of this information.

## 2022-11-05 NOTE — PROGRESS NOTES
Bedside shift change report given to SAM Jones  (oncoming nurse) by Josefina Mckeon RN (offgoing nurse). Report included the following information SBAR.

## 2022-11-05 NOTE — PROGRESS NOTES
Post-Partum Day Number 1 Progress Note    Tommie Campos     Assessment: Doing well, post partum day 1 s/p     Plan:  - Continue routine postpartum and perineal care as well as maternal education.  - Plan discharge home [facility] Discharge Date: Tomorrow. Information for the patient's :  Doris Newton [[id number]]   Vaginal Birth After   Patient doing well without significant complaint. Voiding without difficulty, normal lochia. Vitals:  Visit Vitals  /71 (BP 1 Location: Right upper arm, BP Patient Position: At rest)   Pulse 92   Temp 98.1 °F (36.7 °C)   Resp 16   SpO2 96%   Breastfeeding Unknown     Temp (24hrs), Av.5 °F (36.9 °C), Min:97.6 °F (36.4 °C), Max:101.2 °F (38.4 °C)        Exam:   Patient without distress. Fundus firm, nontender per nursing fundal checks. Perineum with normal lochia noted per nursing assessment. Lower extremities are negative for pathological edema. Labs:     Lab Results   Component Value Date/Time    WBC 7.4 2022 03:45 PM    WBC 8.9 2020 06:00 AM    WBC 10.7 2020 09:33 PM    WBC 9.9 2020 05:13 AM    WBC 9.5 2020 12:34 PM    HGB 11.3 (L) 2022 03:45 PM    HGB 10.2 (L) 2020 06:00 AM    HGB 9.8 (L) 2020 09:33 PM    HGB 9.0 (L) 2020 05:13 AM    HGB 10.8 (L) 2020 12:34 PM    HCT 33.9 (L) 2022 03:45 PM    HCT 32.1 (L) 2020 06:00 AM    HCT 30.9 (L) 2020 09:33 PM    HCT 28.1 (L) 2020 05:13 AM    HCT 33.6 (L) 2020 12:34 PM    PLATELET 204  03:45 PM    PLATELET 635  06:00 AM    PLATELET 893  09:33 PM    PLATELET 545  05:13 AM    PLATELET 521  12:34 PM       No results found for this or any previous visit (from the past 24 hour(s)).

## 2022-11-05 NOTE — L&D DELIVERY NOTE
Delivery Summary    Patient: Amira Silva MRN: [id number]  SSN: [id number]    YOB: 1990  Age: 32 y.o. Sex: female       Information for the patient's :  oJhan Wooten [[id number]]     Labor Events:    Labor: No    Steroids: None   Cervical Ripening Date/Time:       Cervical Ripening Type: Smith/EASI   Antibiotics During Labor:     Rupture Identifier:      Rupture Date/Time: 2022 8:02 AM   Rupture Type: AROM   Amniotic Fluid Volume:      Amniotic Fluid Description: Clear    Amniotic Fluid Odor: None    Induction:         Induction Date/Time:        Indications for Induction:      Augmentation:     Augmentation Date/Time:      Indications for Augmentation:     Labor complications: Additional complications:        Delivery Events:  Indications For Episiotomy:     Episiotomy: None   Perineal Laceration(s):     Repaired:     Periurethral Laceration Location:      Repaired:     Labial Laceration Location:     Repaired:     Sulcal Laceration Location:     Repaired:     Vaginal Laceration Location:     Repaired:     Cervical Laceration Location:     Repaired:     Repair Suture:     Number of Repair Packets:     Estimated Blood Loss (ml):  ml   Quantitative Blood Loss (ml)                Delivery Date: 2022    Delivery Time: 8:58 PM  Delivery Type: Vaginal Birth After    Sex:  Female    Gestational Age: 44w7d   Delivery Clinician:  Danielle Kapoor  Living Status: Living   Delivery Location: L&D L&D 6          APGARS  One minute Five minutes Ten minutes   Skin color: 1   1        Heart rate: 2   2        Grimace: 2   2        Muscle tone: 2   2        Breathin   2        Totals: 9   9            Presentation: Vertex    Position:        Resuscitation Method:  Tactile Stimulation;Suctioning-bulb     Meconium Stained: None      Cord Information: 3 Vessels  Complications: None  Cord around:    Delayed cord clamping?  Yes  Cord clamped date/time:2022 9:00 PM  Disposition of Cord Blood: Lab    Blood Gases Sent?: No    Placenta:  Date/Time: 2022  9:08 PM  Removal: Spontaneous      Appearance: Normal;Intact     Apex Measurements:  Birth Weight:        Birth Length:        Head Circumference:        Chest Circumference:       Abdominal Girth: Other Providers:   LUPE OLMOS;MATI ROA;JOSEP KNUTSON;JAMIN CAMPA;Ana BRIONES, Obstetrician;Primary Nurse;Primary Apex Nurse;Staff Nurse;Staff Nurse           Viable female infant was delivered spontaneously. Fetal head was delivered atraumatically. Shoulders and body delivered without complications. Infant was then placed on mother's abdomen. Infant's mouth and nares were bulb suctioned. Cord was clamped x2 after 1 minute delay and was then cut by father of baby. Placenta was delivered with controlled cord traction and upon inspection was found to be intact with 3vc. Bimanual massage and IV pitocin bolus were administered. Fundus noted to be firm. Small vaginal laceration with pumping vessel was noted and repaired in routine fashion. Excellent hemostasis was assured.  cc. Straight cath performed with return of 300 cc urine. No complications. Group B Strep:   Lab Results   Component Value Date/Time    GrBStrep, External positive 10/17/2022 12:00 AM     Information for the patient's :  Baldev Decker [[id number]]   No results found for: ABORH, PCTABR, PCTDIG, BILI, ABORHEXT, ABORH   No results for input(s): PCO2CB, PO2CB, HCO3I, SO2I, IBD, PTEMPI, SPECTI, PHICB, ISITE, IDEV, IALLEN in the last 72 hours.

## 2022-11-05 NOTE — ROUTINE PROCESS
TRANSFER - IN REPORT:    Verbal report received from MAYTE Manzanares(name) on Huel Loop  being received from L&D(unit) for routine progression of care      Report consisted of patients Situation, Background, Assessment and   Recommendations(SBAR). Information from the following report(s) SBAR was reviewed with the receiving nurse. Opportunity for questions and clarification was provided. Assessment completed upon patients arrival to unit and care assumed.

## 2022-11-05 NOTE — DISCHARGE SUMMARY
Obstetrical Discharge Summary     Name: Mickey Bajwa MRN: [id number]  SSN: [id number]    YOB: 1990  Age: 32 y.o. Sex: female      Admit Date: 11/3/2022    Discharge Date: 2022     Admitting Physician: Chelsi Farmer MD     Attending Physician:  Maninder Manning MD     Admission Diagnoses: 38 weeks gestation of pregnancy [Z3A.38]  Chronic hypertension affecting pregnancy [O10.919]    Discharge Diagnoses:   Information for the patient's :  Rolo Rose [[id number]]   Delivery of a 3.275 kg female infant via Vaginal Birth After   on 2022 at 8:58 PM  by Chelsi Farmer. Apgars were 9  and 9 . Additional Diagnoses:   Hospital Problems  Date Reviewed: 2020            Codes Class Noted POA    38 weeks gestation of pregnancy ICD-10-CM: Z3A.38  ICD-9-CM: V22.2  11/3/2022 Unknown        Chronic hypertension affecting pregnancy ICD-10-CM: O10.919  ICD-9-CM: 642.00  11/3/2022 Unknown          Lab Results   Component Value Date/Time    Rubella, External immune 2022 12:00 AM    GrBStrep, External positive 10/17/2022 12:00 AM       Hospital Course: Normal hospital course following the delivery. Patient Instructions:   Current Discharge Medication List        CONTINUE these medications which have CHANGED    Details   ibuprofen (MOTRIN) 800 mg tablet Take 1 Tablet by mouth every eight (8) hours. Qty: 30 Tablet, Refills: 0      labetaloL (NORMODYNE) 200 mg tablet Take 1.5 Tablets by mouth two (2) times a day. Qty: 60 Tablet, Refills: 1           CONTINUE these medications which have NOT CHANGED    Details   prenatal 123/iron/folic/omeg3s (ONE-A-DAY WOMEN'S PRENATAL 1 PO) Take  by mouth. senna-docusate (PERICOLACE) 8.6-50 mg per tablet Take 2 Tabs by mouth daily as needed for Constipation. Qty: 60 Tab, Refills: 0             Disposition at Discharge: Home or self care    Condition at Discharge: Stable    Reference my discharge instructions.     Follow-up Appointments Procedures    FOLLOW UP VISIT Appointment in: One Week     Standing Status:   Standing     Number of Occurrences:   1     Order Specific Question:   Appointment in     Answer:    One Week        Signed By:  Shannon Maldonado MD     November 5, 2022

## 2022-11-06 VITALS
TEMPERATURE: 97.9 F | OXYGEN SATURATION: 98 % | SYSTOLIC BLOOD PRESSURE: 134 MMHG | HEART RATE: 76 BPM | DIASTOLIC BLOOD PRESSURE: 86 MMHG | RESPIRATION RATE: 16 BRPM

## 2022-11-06 LAB
ABO + RH BLD: NORMAL
BLD PROD TYP BPU: NORMAL
BPU ID: NORMAL
FETAL SCREEN,FMHS: NORMAL
STATUS OF UNIT,%ST: NORMAL
UNIT DIVISION, %UDIV: 0

## 2022-11-06 PROCEDURE — 74011250637 HC RX REV CODE- 250/637: Performed by: STUDENT IN AN ORGANIZED HEALTH CARE EDUCATION/TRAINING PROGRAM

## 2022-11-06 PROCEDURE — 74011250637 HC RX REV CODE- 250/637: Performed by: OBSTETRICS & GYNECOLOGY

## 2022-11-06 RX ADMIN — ACETAMINOPHEN 650 MG: 325 TABLET ORAL at 08:22

## 2022-11-06 RX ADMIN — IBUPROFEN 800 MG: 400 TABLET, FILM COATED ORAL at 01:21

## 2022-11-06 RX ADMIN — IBUPROFEN 800 MG: 400 TABLET, FILM COATED ORAL at 08:22

## 2022-11-06 RX ADMIN — LABETALOL HYDROCHLORIDE 300 MG: 100 TABLET, FILM COATED ORAL at 08:25

## 2022-11-06 NOTE — ROUTINE PROCESS
Bedside and Verbal shift change report given to Dinorah Moya (oncoming nurse) by SAM Blakely (offgoing nurse). Report included the following information SBAR. System downtime was enacted for one hour to accomodate the ending of Daylight Saving Time at 2:00 a.m. Clinical documentation has been captured on paper to be scanned into the system. Medications administered during this time have been entered when the system became available. I have reviewed and confirmed nurses' notes...

## 2022-11-06 NOTE — PROGRESS NOTES
Bedside shift change report given to SAM Molina (oncoming nurse) by Pennie Funes RN (offgoing nurse). Report included the following information SBAR. 1030: I discussed discharge paperwork with patient.

## 2022-11-06 NOTE — PROGRESS NOTES
Post-Partum Day Number 2 Progress Note    Refugio Namita     Assessment: Doing well, post partum day 2    Rh neg- received rhogam  CHTN- BPs well controlled, plan 1 week BP check     Plan:   - Discharge home today  - Follow up in office in 1 week(s) with [facility].  - Pain medication prescription(s) sent. - Questions answered. Information for the patient's :  Param Mask [[id number]]   Vaginal Birth After   Patient doing well without significant complaint. Voiding without difficulty, normal lochia. Ready for discharge home. Vitals:  Visit Vitals  /86 (BP 1 Location: Right arm, BP Patient Position: At rest)   Pulse 88   Temp 97.7 °F (36.5 °C)   Resp 16   SpO2 98%   Breastfeeding Unknown     Temp (24hrs), Av.8 °F (36.6 °C), Min:97.6 °F (36.4 °C), Max:98.1 °F (36.7 °C)      Exam:        Patient without distress. Fundus firm, nontender per nursing fundal checks                Perineum with normal lochia noted per nursing assessment                Lower extremities are negative for pathological edema    Labs:     Lab Results   Component Value Date/Time    WBC 7.4 2022 03:45 PM    WBC 8.9 2020 06:00 AM    WBC 10.7 2020 09:33 PM    WBC 9.9 2020 05:13 AM    WBC 9.5 2020 12:34 PM    HGB 11.3 (L) 2022 03:45 PM    HGB 10.2 (L) 2020 06:00 AM    HGB 9.8 (L) 2020 09:33 PM    HGB 9.0 (L) 2020 05:13 AM    HGB 10.8 (L) 2020 12:34 PM    HCT 33.9 (L) 2022 03:45 PM    HCT 32.1 (L) 2020 06:00 AM    HCT 30.9 (L) 2020 09:33 PM    HCT 28.1 (L) 2020 05:13 AM    HCT 33.6 (L) 2020 12:34 PM    PLATELET 209  03:45 PM    PLATELET 490 8800 06:00 AM    PLATELET 445  09:33 PM    PLATELET 014  05:13 AM    PLATELET 493  12:34 PM       No results found for this or any previous visit (from the past 24 hour(s)).

## 2022-11-07 NOTE — ANESTHESIA POSTPROCEDURE EVALUATION
* No procedures listed *.    spinal    Anesthesia Post Evaluation      Multimodal analgesia: multimodal analgesia used between 6 hours prior to anesthesia start to PACU discharge  Patient location during evaluation: PACU  Level of consciousness: awake  Pain management: adequate  Airway patency: patent  Anesthetic complications: no  Cardiovascular status: acceptable  Respiratory status: acceptable  Hydration status: acceptable  Post anesthesia nausea and vomiting:  none  Final Post Anesthesia Temperature Assessment:  Normothermia (36.0-37.5 degrees C)      INITIAL Post-op Vital signs: No vitals data found for the desired time range.

## (undated) DEVICE — DRESSING AQUACEL ADVANTAGE ALG W4XL5IN RECT GREATER ABSRB HYDRFBR TECHNOLOGY

## (undated) DEVICE — PACK PROCEDURE SURG C SECT KT SMH

## (undated) DEVICE — COVERALL PREM SMS 2XL KNIT --

## (undated) DEVICE — DEVON™ KNEE AND BODY STRAP 60" X 3" (1.5 M X 7.6 CM): Brand: DEVON

## (undated) DEVICE — SUTURE VCRL SZ 0 L36IN ABSRB VLT L40MM CT 1/2 CIR J358H

## (undated) DEVICE — DRAPE FLD WRM W44XL66IN C6L FOR INTRATEMP SYS THERMABASIN

## (undated) DEVICE — 3000CC GUARDIAN II: Brand: GUARDIAN

## (undated) DEVICE — SUTURE PLN GUT SZ 2-0 L27IN ABSRB YELLOWISH TAN L70MM XLH 53T

## (undated) DEVICE — KENDALL SCD EXPRESS SLEEVES, KNEE LENGTH, MEDIUM: Brand: KENDALL SCD

## (undated) DEVICE — REM POLYHESIVE ADULT PATIENT RETURN ELECTRODE: Brand: VALLEYLAB

## (undated) DEVICE — TOWEL,OR,DSP,ST,BLUE,STD,2/PK,40PK/CS: Brand: MEDLINE

## (undated) DEVICE — SPONGE: LAP 18X18 W  200/CS: Brand: MEDICAL ACTION INDUSTRIES

## (undated) DEVICE — ELECTROSURGICAL DEVICE HOLSTER;FOR USE WITH MAXIMUM PEAK VOLTAGE OF 4000 V: Brand: FORCE TRIVERSE

## (undated) DEVICE — AGENT HEMSTAT 3GM PURIFIED PLNT STARCH PWD ABSRB ARISTA AH

## (undated) DEVICE — SUTURE MCRYL SZ 3-0 L27IN ABSRB UD L60MM KS STR REV CUT Y523H

## (undated) DEVICE — SOLUTION IV 1000ML 0.9% SOD CHL

## (undated) DEVICE — SOLUTION IRRIG 1000ML H2O STRL BLT

## (undated) DEVICE — SPINAL TRAY NDL 24 GAX4 IN SPROTTE

## (undated) DEVICE — LIGHT HANDLE: Brand: DEVON

## (undated) DEVICE — ROYALSILK SURGICAL GOWN, L: Brand: CONVERTORS